# Patient Record
Sex: MALE | Race: WHITE | NOT HISPANIC OR LATINO | Employment: OTHER | URBAN - METROPOLITAN AREA
[De-identification: names, ages, dates, MRNs, and addresses within clinical notes are randomized per-mention and may not be internally consistent; named-entity substitution may affect disease eponyms.]

---

## 2024-08-19 NOTE — PROGRESS NOTES
EF 53%,Mr. Glez is an 84-year-old male with past medical history of Parkinson's and dementia.  He was taken to an outside facility with concerns of altered mental status and confusion.  He was admitted to [facility] where he was found to have bilateral small PE on CTA.  Also found to have an acute DVT.  He has been receiving therapeutic Lovenox.  PE was thought to be induced by receiving estrogen therapy for hypersexuality at his nursing home, this has been discontinued.  CT and MRI brain were negative.  Patient was noted to have some pocketing and coughing with any kind of intake including liquids.  NG tube was placed to give his Parkinson's medications and this did not improve.  Requested transfer to our facility for speech evaluation.  He is currently 100% on room air.

## 2024-08-20 ENCOUNTER — HOSPITAL ENCOUNTER (INPATIENT)
Facility: HOSPITAL | Age: 84
LOS: 1 days | Discharge: NURSING, REHAB OR LONG-TERM CARE | End: 2024-08-21
Attending: STUDENT IN AN ORGANIZED HEALTH CARE EDUCATION/TRAINING PROGRAM | Admitting: FAMILY MEDICINE
Payer: MEDICARE

## 2024-08-20 ENCOUNTER — APPOINTMENT (OUTPATIENT)
Dept: GENERAL RADIOLOGY | Facility: HOSPITAL | Age: 84
End: 2024-08-20
Payer: MEDICARE

## 2024-08-20 DIAGNOSIS — R13.12 OROPHARYNGEAL DYSPHAGIA: Primary | ICD-10-CM

## 2024-08-20 DIAGNOSIS — R26.2 DIFFICULTY WALKING: ICD-10-CM

## 2024-08-20 PROBLEM — F02.80 PARKINSON'S DISEASE DEMENTIA: Status: ACTIVE | Noted: 2024-08-20

## 2024-08-20 PROBLEM — R13.10 DYSPHAGIA: Status: ACTIVE | Noted: 2024-08-20

## 2024-08-20 PROBLEM — I26.99 PULMONARY EMBOLI: Status: ACTIVE | Noted: 2024-08-20

## 2024-08-20 PROBLEM — G20.A1 PARKINSON'S DISEASE DEMENTIA: Status: ACTIVE | Noted: 2024-08-20

## 2024-08-20 LAB
ALBUMIN SERPL-MCNC: 2.8 G/DL (ref 3.5–5.2)
ALBUMIN/GLOB SERPL: 0.9 G/DL
ALP SERPL-CCNC: 46 U/L (ref 39–117)
ALT SERPL W P-5'-P-CCNC: <5 U/L (ref 1–41)
ANION GAP SERPL CALCULATED.3IONS-SCNC: 7.5 MMOL/L (ref 5–15)
AST SERPL-CCNC: 27 U/L (ref 1–40)
BACTERIA UR QL AUTO: ABNORMAL /HPF
BASOPHILS # BLD AUTO: 0.06 10*3/MM3 (ref 0–0.2)
BASOPHILS NFR BLD AUTO: 0.8 % (ref 0–1.5)
BILIRUB SERPL-MCNC: 0.4 MG/DL (ref 0–1.2)
BILIRUB UR QL STRIP: NEGATIVE
BUN SERPL-MCNC: 21 MG/DL (ref 8–23)
BUN/CREAT SERPL: 26.9 (ref 7–25)
CALCIUM SPEC-SCNC: 8.3 MG/DL (ref 8.6–10.5)
CHLORIDE SERPL-SCNC: 105 MMOL/L (ref 98–107)
CLARITY UR: CLEAR
CO2 SERPL-SCNC: 27.5 MMOL/L (ref 22–29)
COLOR UR: YELLOW
CREAT SERPL-MCNC: 0.78 MG/DL (ref 0.76–1.27)
DEPRECATED RDW RBC AUTO: 41.1 FL (ref 37–54)
EGFRCR SERPLBLD CKD-EPI 2021: 89 ML/MIN/1.73
EOSINOPHIL # BLD AUTO: 0.27 10*3/MM3 (ref 0–0.4)
EOSINOPHIL NFR BLD AUTO: 3.6 % (ref 0.3–6.2)
ERYTHROCYTE [DISTWIDTH] IN BLOOD BY AUTOMATED COUNT: 12.5 % (ref 12.3–15.4)
GLOBULIN UR ELPH-MCNC: 3.2 GM/DL
GLUCOSE SERPL-MCNC: 108 MG/DL (ref 65–99)
GLUCOSE UR STRIP-MCNC: NEGATIVE MG/DL
HCT VFR BLD AUTO: 36.4 % (ref 37.5–51)
HGB BLD-MCNC: 12.2 G/DL (ref 13–17.7)
HGB UR QL STRIP.AUTO: ABNORMAL
HOLD SPECIMEN: NORMAL
HOLD SPECIMEN: NORMAL
HYALINE CASTS UR QL AUTO: ABNORMAL /LPF
IMM GRANULOCYTES # BLD AUTO: 0.12 10*3/MM3 (ref 0–0.05)
IMM GRANULOCYTES NFR BLD AUTO: 1.6 % (ref 0–0.5)
KETONES UR QL STRIP: ABNORMAL
LEUKOCYTE ESTERASE UR QL STRIP.AUTO: ABNORMAL
LYMPHOCYTES # BLD AUTO: 1.61 10*3/MM3 (ref 0.7–3.1)
LYMPHOCYTES NFR BLD AUTO: 21.6 % (ref 19.6–45.3)
MCH RBC QN AUTO: 30.6 PG (ref 26.6–33)
MCHC RBC AUTO-ENTMCNC: 33.5 G/DL (ref 31.5–35.7)
MCV RBC AUTO: 91.2 FL (ref 79–97)
MONOCYTES # BLD AUTO: 0.56 10*3/MM3 (ref 0.1–0.9)
MONOCYTES NFR BLD AUTO: 7.5 % (ref 5–12)
NEUTROPHILS NFR BLD AUTO: 4.84 10*3/MM3 (ref 1.7–7)
NEUTROPHILS NFR BLD AUTO: 64.9 % (ref 42.7–76)
NITRITE UR QL STRIP: NEGATIVE
NRBC BLD AUTO-RTO: 0 /100 WBC (ref 0–0.2)
PH UR STRIP.AUTO: 6.5 [PH] (ref 5–8)
PLATELET # BLD AUTO: 213 10*3/MM3 (ref 140–450)
PMV BLD AUTO: 9.9 FL (ref 6–12)
POTASSIUM SERPL-SCNC: 3.6 MMOL/L (ref 3.5–5.2)
PROT SERPL-MCNC: 6 G/DL (ref 6–8.5)
PROT UR QL STRIP: ABNORMAL
RBC # BLD AUTO: 3.99 10*6/MM3 (ref 4.14–5.8)
RBC # UR STRIP: ABNORMAL /HPF
REF LAB TEST METHOD: ABNORMAL
SODIUM SERPL-SCNC: 140 MMOL/L (ref 136–145)
SP GR UR STRIP: 1.02 (ref 1–1.03)
SQUAMOUS #/AREA URNS HPF: ABNORMAL /HPF
UROBILINOGEN UR QL STRIP: ABNORMAL
WBC # UR STRIP: ABNORMAL /HPF
WBC NRBC COR # BLD AUTO: 7.46 10*3/MM3 (ref 3.4–10.8)
WHOLE BLOOD HOLD COAG: NORMAL

## 2024-08-20 PROCEDURE — 71045 X-RAY EXAM CHEST 1 VIEW: CPT

## 2024-08-20 PROCEDURE — 25810000003 LACTATED RINGERS PER 1000 ML: Performed by: FAMILY MEDICINE

## 2024-08-20 PROCEDURE — 74230 X-RAY XM SWLNG FUNCJ C+: CPT

## 2024-08-20 PROCEDURE — 92610 EVALUATE SWALLOWING FUNCTION: CPT

## 2024-08-20 PROCEDURE — 81001 URINALYSIS AUTO W/SCOPE: CPT | Performed by: FAMILY MEDICINE

## 2024-08-20 PROCEDURE — 87086 URINE CULTURE/COLONY COUNT: CPT | Performed by: FAMILY MEDICINE

## 2024-08-20 PROCEDURE — 99223 1ST HOSP IP/OBS HIGH 75: CPT | Performed by: FAMILY MEDICINE

## 2024-08-20 PROCEDURE — 25010000002 ENOXAPARIN PER 10 MG: Performed by: FAMILY MEDICINE

## 2024-08-20 PROCEDURE — 85025 COMPLETE CBC W/AUTO DIFF WBC: CPT | Performed by: FAMILY MEDICINE

## 2024-08-20 PROCEDURE — 92611 MOTION FLUOROSCOPY/SWALLOW: CPT

## 2024-08-20 PROCEDURE — 97161 PT EVAL LOW COMPLEX 20 MIN: CPT

## 2024-08-20 PROCEDURE — 80053 COMPREHEN METABOLIC PANEL: CPT | Performed by: FAMILY MEDICINE

## 2024-08-20 RX ORDER — LISINOPRIL 5 MG/1
5 TABLET ORAL DAILY
COMMUNITY
Start: 2024-08-20 | End: 2025-08-21

## 2024-08-20 RX ORDER — ONDANSETRON 2 MG/ML
4 INJECTION INTRAMUSCULAR; INTRAVENOUS EVERY 6 HOURS PRN
Status: DISCONTINUED | OUTPATIENT
Start: 2024-08-20 | End: 2024-08-21 | Stop reason: HOSPADM

## 2024-08-20 RX ORDER — SODIUM CHLORIDE 0.9 % (FLUSH) 0.9 %
10 SYRINGE (ML) INJECTION EVERY 12 HOURS SCHEDULED
Status: DISCONTINUED | OUTPATIENT
Start: 2024-08-20 | End: 2024-08-21 | Stop reason: HOSPADM

## 2024-08-20 RX ORDER — CARBIDOPA/LEVODOPA 25MG-250MG
1 TABLET ORAL 4 TIMES DAILY
COMMUNITY

## 2024-08-20 RX ORDER — AMOXICILLIN 250 MG
2 CAPSULE ORAL 2 TIMES DAILY PRN
Status: DISCONTINUED | OUTPATIENT
Start: 2024-08-20 | End: 2024-08-21 | Stop reason: HOSPADM

## 2024-08-20 RX ORDER — POLYETHYLENE GLYCOL 3350 17 G/17G
17 POWDER, FOR SOLUTION ORAL DAILY PRN
Status: DISCONTINUED | OUTPATIENT
Start: 2024-08-20 | End: 2024-08-21 | Stop reason: HOSPADM

## 2024-08-20 RX ORDER — ACETAMINOPHEN 325 MG/1
650 TABLET ORAL EVERY 4 HOURS PRN
COMMUNITY

## 2024-08-20 RX ORDER — SODIUM CHLORIDE 9 MG/ML
40 INJECTION, SOLUTION INTRAVENOUS AS NEEDED
Status: DISCONTINUED | OUTPATIENT
Start: 2024-08-20 | End: 2024-08-21 | Stop reason: HOSPADM

## 2024-08-20 RX ORDER — FOLIC ACID 1 MG/1
1 TABLET ORAL DAILY
COMMUNITY
Start: 2024-07-25

## 2024-08-20 RX ORDER — DIVALPROEX SODIUM 125 MG/1
250 CAPSULE, COATED PELLETS ORAL 2 TIMES DAILY
COMMUNITY

## 2024-08-20 RX ORDER — SODIUM CHLORIDE 0.9 % (FLUSH) 0.9 %
10 SYRINGE (ML) INJECTION AS NEEDED
Status: DISCONTINUED | OUTPATIENT
Start: 2024-08-20 | End: 2024-08-21 | Stop reason: HOSPADM

## 2024-08-20 RX ORDER — LEVOTHYROXINE SODIUM 25 UG/1
25 TABLET ORAL DAILY
COMMUNITY

## 2024-08-20 RX ORDER — LISINOPRIL 10 MG/1
5 TABLET ORAL
Status: DISCONTINUED | OUTPATIENT
Start: 2024-08-20 | End: 2024-08-21 | Stop reason: HOSPADM

## 2024-08-20 RX ORDER — FOLIC ACID 1 MG/1
1 TABLET ORAL DAILY
Status: DISCONTINUED | OUTPATIENT
Start: 2024-08-20 | End: 2024-08-21 | Stop reason: HOSPADM

## 2024-08-20 RX ORDER — SODIUM CHLORIDE, SODIUM LACTATE, POTASSIUM CHLORIDE, CALCIUM CHLORIDE 600; 310; 30; 20 MG/100ML; MG/100ML; MG/100ML; MG/100ML
75 INJECTION, SOLUTION INTRAVENOUS CONTINUOUS
Status: ACTIVE | OUTPATIENT
Start: 2024-08-20 | End: 2024-08-21

## 2024-08-20 RX ORDER — ATORVASTATIN CALCIUM 20 MG/1
20 TABLET, FILM COATED ORAL NIGHTLY
Status: DISCONTINUED | OUTPATIENT
Start: 2024-08-20 | End: 2024-08-21 | Stop reason: HOSPADM

## 2024-08-20 RX ORDER — ATORVASTATIN CALCIUM 20 MG/1
20 TABLET, FILM COATED ORAL DAILY
COMMUNITY

## 2024-08-20 RX ORDER — ENOXAPARIN SODIUM 100 MG/ML
1 INJECTION SUBCUTANEOUS EVERY 12 HOURS
Status: DISCONTINUED | OUTPATIENT
Start: 2024-08-20 | End: 2024-08-21 | Stop reason: HOSPADM

## 2024-08-20 RX ORDER — LEVOTHYROXINE SODIUM 25 UG/1
25 TABLET ORAL
Status: DISCONTINUED | OUTPATIENT
Start: 2024-08-20 | End: 2024-08-21 | Stop reason: HOSPADM

## 2024-08-20 RX ORDER — ENOXAPARIN SODIUM 100 MG/ML
1 INJECTION SUBCUTANEOUS EVERY 12 HOURS
Status: DISCONTINUED | OUTPATIENT
Start: 2024-08-20 | End: 2024-08-20

## 2024-08-20 RX ORDER — DIVALPROEX SODIUM 125 MG/1
250 CAPSULE, COATED PELLETS ORAL 2 TIMES DAILY
Status: DISCONTINUED | OUTPATIENT
Start: 2024-08-20 | End: 2024-08-21 | Stop reason: HOSPADM

## 2024-08-20 RX ORDER — BISACODYL 5 MG/1
5 TABLET, DELAYED RELEASE ORAL DAILY PRN
Status: DISCONTINUED | OUTPATIENT
Start: 2024-08-20 | End: 2024-08-21 | Stop reason: HOSPADM

## 2024-08-20 RX ORDER — PROPRANOLOL HYDROCHLORIDE 120 MG/1
120 CAPSULE, EXTENDED RELEASE ORAL DAILY
COMMUNITY
Start: 2024-07-10

## 2024-08-20 RX ORDER — CARBIDOPA/LEVODOPA 25MG-250MG
1 TABLET ORAL 4 TIMES DAILY
Status: DISCONTINUED | OUTPATIENT
Start: 2024-08-20 | End: 2024-08-21 | Stop reason: HOSPADM

## 2024-08-20 RX ORDER — BISACODYL 10 MG
10 SUPPOSITORY, RECTAL RECTAL DAILY PRN
Status: DISCONTINUED | OUTPATIENT
Start: 2024-08-20 | End: 2024-08-21 | Stop reason: HOSPADM

## 2024-08-20 RX ORDER — LANOLIN ALCOHOL/MO/W.PET/CERES
1000 CREAM (GRAM) TOPICAL DAILY
COMMUNITY
Start: 2024-07-25

## 2024-08-20 RX ORDER — PROPRANOLOL HCL 60 MG
120 CAPSULE, EXTENDED RELEASE 24HR ORAL DAILY
Status: DISCONTINUED | OUTPATIENT
Start: 2024-08-20 | End: 2024-08-21 | Stop reason: HOSPADM

## 2024-08-20 RX ORDER — ESTRADIOL 2 MG/1
2 TABLET ORAL DAILY
COMMUNITY
Start: 2024-07-10 | End: 2024-08-21 | Stop reason: HOSPADM

## 2024-08-20 RX ADMIN — BARIUM SULFATE 20 ML: 400 PASTE ORAL at 12:27

## 2024-08-20 RX ADMIN — BARIUM SULFATE 50 ML: 400 SUSPENSION ORAL at 12:27

## 2024-08-20 RX ADMIN — ATORVASTATIN CALCIUM 20 MG: 20 TABLET, FILM COATED ORAL at 20:19

## 2024-08-20 RX ADMIN — Medication 10 ML: at 09:41

## 2024-08-20 RX ADMIN — DIVALPROEX SODIUM 250 MG: 125 CAPSULE, COATED PELLETS ORAL at 20:19

## 2024-08-20 RX ADMIN — ENOXAPARIN SODIUM 80 MG: 100 INJECTION SUBCUTANEOUS at 20:19

## 2024-08-20 RX ADMIN — LISINOPRIL 5 MG: 10 TABLET ORAL at 14:50

## 2024-08-20 RX ADMIN — CARBIDOPA AND LEVODOPA 1 TABLET: 25; 250 TABLET ORAL at 18:14

## 2024-08-20 RX ADMIN — CARBIDOPA AND LEVODOPA 1 TABLET: 25; 250 TABLET ORAL at 14:52

## 2024-08-20 RX ADMIN — Medication 10 ML: at 20:23

## 2024-08-20 RX ADMIN — LEVOTHYROXINE SODIUM 25 MCG: 0.03 TABLET ORAL at 14:53

## 2024-08-20 RX ADMIN — SODIUM CHLORIDE, POTASSIUM CHLORIDE, SODIUM LACTATE AND CALCIUM CHLORIDE 75 ML/HR: 600; 310; 30; 20 INJECTION, SOLUTION INTRAVENOUS at 12:58

## 2024-08-20 RX ADMIN — SODIUM CHLORIDE, POTASSIUM CHLORIDE, SODIUM LACTATE AND CALCIUM CHLORIDE 75 ML/HR: 600; 310; 30; 20 INJECTION, SOLUTION INTRAVENOUS at 01:00

## 2024-08-20 RX ADMIN — BARIUM SULFATE 55 ML: 0.81 POWDER, FOR SUSPENSION ORAL at 12:27

## 2024-08-20 RX ADMIN — FOLIC ACID 1 MG: 1 TABLET ORAL at 14:50

## 2024-08-20 RX ADMIN — PROPRANOLOL HYDROCHLORIDE 120 MG: 60 CAPSULE, EXTENDED RELEASE ORAL at 14:50

## 2024-08-20 RX ADMIN — ENOXAPARIN SODIUM 80 MG: 100 INJECTION SUBCUTANEOUS at 06:23

## 2024-08-20 RX ADMIN — CARBIDOPA AND LEVODOPA 1 TABLET: 25; 250 TABLET ORAL at 20:19

## 2024-08-20 NOTE — PLAN OF CARE
Goal Outcome Evaluation:         ASSESSMENT/ PLAN OF CARE:  Pt presents with limitations, noted below, that impede patient's ability to tolerate least restrictive diet safely and independently. The skills of a therapist will be required to safely and effectively implement the following treatment plan to restore maximal level of function.    PROBLEMS:  1.  Risk of aspiration, swallow delay     TREATMENT: Speech therapy for dysphagia, education of strategies and tolerance of least restrictive diet.    FREQUENCY/DURATION: Daily, 5 days a week    REHAB POTENTIAL:  Pt has good/fair rehab potential.  The following limitations may influence improvement/ length of tx medical status.    RECOMMENDATIONS:   1.   DIET: Mechanical soft solids, thin liquids    2.  POSITION: Fully upright for all p.o. intake, 30 minutes following    3.  COMPENSATORY STRATEGIES: Assist for feeding, small bites and sips, meds in applesauce              Anticipated Discharge Disposition (SLP): anticipate therapy at next level of care

## 2024-08-20 NOTE — PLAN OF CARE
Goal Outcome Evaluation:  Plan of Care Reviewed With: patient        Progress: no change  Outcome Evaluation: Patient presents with deficits in balance, endurance, transfers, and ambulation. Patient will benefit from skilled PT services to address these mobility deficits and decrease risk of falls.      Anticipated Discharge Disposition (PT): sub acute care setting

## 2024-08-20 NOTE — CONSULTS
"Nutrition Services    Patient Name: Vic Glez  YOB: 1940  MRN: [id number]  Admission date: 8/20/2024      CLINICAL NUTRITION ASSESSMENT      Reason for Assessment  MST Score 2+ and Pressure Injury     H&P:  Past Medical History:   Diagnosis Date    CKD (chronic kidney disease)     Dementia     DVT (deep vein thrombosis) in pregnancy     Hypertension     Parkinson's disease         Current Problems:   Active Hospital Problems    Diagnosis     **Dysphagia     Pulmonary emboli     Parkinson's disease dementia         Nutrition/Diet History         Narrative   New admit from a SNF for Parkinson's-related dysphagia. Noted to have pocketing as well as coughing with liquid PO intake. Brought here for dysphagia evaluation. Screened at risk d/t being unsure of wt loss. No wt history available in EMR. Current wt within healthy range for age. Per chart, pt had an NG placed for provision of meds which was unsuccessful, not noted to have NG currently. SLP has placed order for mechanical ground texture diet.     Anthropometrics        Current Height, Weight Height: 180.3 cm (71\")  Weight: 78.9 kg (173 lb 15.1 oz)   Current BMI Body mass index is 24.26 kg/m².   BMI Classification Normal range - Healthy BMI for age 23-30   % %   Adjusted Body Weight (ABW)    Weight Hx  Wt Readings from Last 30 Encounters:   08/20/24 0300 78.9 kg (173 lb 15.1 oz)          Wt Change Observation Unable to trend d/t lack of history     Estimated/Assessed Needs  Estimated Needs based on: Current Body Weight       Energy Requirements 25-30 kcal/kg   EST Needs (kcal/day) 1975-2370       Protein Requirements 1.0-1.2 g/kg   EST Daily Needs (g/day) 79-95       Fluid Requirements 1 ml/kcal    Estimated Needs (mL/day) 1975-2370     Labs/Medications         Pertinent Labs Reviewed.   Results from last 7 days   Lab Units 08/20/24  0055   SODIUM mmol/L 140   POTASSIUM mmol/L 3.6   CHLORIDE mmol/L 105   CO2 mmol/L 27.5   BUN mg/dL 21 " "  CREATININE mg/dL 0.78   CALCIUM mg/dL 8.3*   BILIRUBIN mg/dL 0.4   ALK PHOS U/L 46   ALT (SGPT) U/L <5   AST (SGOT) U/L 27   GLUCOSE mg/dL 108*     Results from last 7 days   Lab Units 08/20/24  0055   HEMOGLOBIN g/dL 12.2*   HEMATOCRIT % 36.4*     No results found for: \"COVID19\"  No results found for: \"HGBA1C\"      Pertinent Medications Reviewed.     Malnutrition Severity Assessment              Nutrition Diagnosis         Nutrition Dx Problem 1 Inadequate oral Intake related to  swallowing difficulty  as evidenced by  pocketing, coughing, SLP order for mechanical ground texture.     Nutrition Intervention           Current Nutrition Orders & Evaluation of Intake       Current PO Diet No diet orders on file   Supplement No active supplement orders           Nutrition Intervention/Prescription        Continue least restrictive diet texture as recommended by SLP  Dietary restrictions not appropriate d/t advanced age and condition  Add ONS if intake is not meeting needs on new texture        Medical Nutrition Therapy/Nutrition Education          Learner     Readiness N/A  N/A     Method     Response N/A  N/A     Monitor/Evaluation        Monitor PO intake, Weight, Swallow function, Diet advancement     Nutrition Discharge Plan         To be determined     Electronically signed by:  Alexia Talavera RD  08/20/24 12:15 EDT    "

## 2024-08-20 NOTE — THERAPY EVALUATION
Acute Care - Physical Therapy Initial Evaluation   [facility]     Patient Name: Vic Glez  : 1940  MRN: [id number]  Today's Date: 2024      Visit Dx:     ICD-10-CM ICD-9-CM   1. Oropharyngeal dysphagia  R13.12 787.22   2. Difficulty walking  R26.2 719.7     Patient Active Problem List   Diagnosis    Dysphagia    Pulmonary emboli    Parkinson's disease dementia     Past Medical History:   Diagnosis Date    CKD (chronic kidney disease)     Dementia     DVT (deep vein thrombosis) in pregnancy     Hypertension     Parkinson's disease      History reviewed. No pertinent surgical history.  PT Assessment (Last 12 Hours)       PT Evaluation and Treatment       Row Name 24 1317          Physical Therapy Time and Intention    Document Type evaluation  -AV     Mode of Treatment individual therapy;physical therapy  -AV       Row Name 24 1317          General Information    Patient Profile Reviewed yes  -AV     Patient Observations alert;cooperative  -AV     Prior Level of Function --  Per EMR, patient is from [facility]. Required assist for ADLs. Has [facility] for mobility. No family at bedside to verify prior level.  -AV     Existing Precautions/Restrictions fall  -AV       Row Name 24 1317          Living Environment    People in Home facility resident  [facility]  -AV       Row Name 24 1317          Cognition    Orientation Status (Cognition) oriented to;person;place;verbal cues/prompts needed for orientation  Primarily nods head yes/no. Follows all commands  -AV       Row Name 24 1317          Range of Motion (ROM)    Range of Motion bilateral lower extremities;ROM is WFL  -AV       Row Name 24 1317          Bed Mobility    Bed Mobility supine-sit;sit-supine  -AV     Supine-Sit [place] (Bed Mobility) moderate assist (50% patient effort)  -AV     Sit-Supine [place] (Bed Mobility) minimum assist (75% patient effort)  -AV       Row Name  08/20/24 1317          Transfers    Transfers sit-stand transfer;stand-sit transfer  -AV     Comment, (Transfers) Tactile cues to facilitate anterior trunk lean to facilitate stand  -AV       Row Name 08/20/24 1317          Sit-Stand Transfer    Sit-Stand [place] (Transfers) moderate assist (50% patient effort)  -AV     Assistive Device (Sit-Stand Transfers) walker, front-wheeled  -AV       Row Name 08/20/24 1317          Stand-Sit Transfer    Stand-Sit [place] (Transfers) moderate assist (50% patient effort)  -AV     Assistive Device (Stand-Sit Transfers) walker, front-wheeled  -AV       Row Name 08/20/24 1317          Gait/Stairs (Locomotion)    Gait/Stairs Locomotion gait/ambulation independence;gait/ambulation assistive device;distance ambulated  -AV     [place] Level (Gait) moderate assist (50% patient effort)  -AV     Assistive Device (Gait) walker, front-wheeled  -AV     Distance in Feet (Gait) 6  sidestepping right to move towards head of bed prior to return to supine  -AV       Row Name 08/20/24 1317          Safety Issues, Functional Mobility    Impairments Affecting Function (Mobility) balance;cognition;endurance/activity tolerance;strength;postural/trunk control  -AV       Row Name 08/20/24 1317          Balance    Balance Assessment standing dynamic balance  -AV     Dynamic Standing Balance moderate assist  -AV     Position/Device Used, Standing Balance supported;walker, front-wheeled  -AV       Row Name             Wound sacral spine Pressure Injury    Wound - Properties Group Present on Original Admission: Y  -NB Location: sacral spine  -NB Primary Wound Type: Pressure inj  -NB    Retired Wound - Properties Group Present on Original Admission: Y  -NB Location: sacral spine  -NB Primary Wound Type: Pressure inj  -NB    Retired Wound - Properties Group Present on Original Admission: Y  -NB Location: sacral spine  -NB Primary Wound Type: Pressure inj  -NB      Row Name 08/20/24 1317           Plan of Care Review    Plan of Care Reviewed With patient  -AV     Progress no change  -AV     Outcome Evaluation Patient presents with deficits in balance, endurance, transfers, and ambulation. Patient will benefit from skilled PT services to address these mobility deficits and decrease risk of falls.  -AV       Row Name 08/20/24 1317          Therapy Assessment/Plan (PT)    Rehab Potential (PT) good, to achieve stated therapy goals  -AV     Criteria for Skilled Interventions Met (PT) yes;meets criteria  -AV     Therapy Frequency (PT) daily  -AV     Predicted Duration of Therapy Intervention (PT) 10 days  -AV     Problem List (PT) problems related to;balance;cognition;mobility;postural control  -AV     Activity Limitations Related to Problem List (PT) unable to transfer safely;unable to ambulate safely  -AV       Row Name 08/20/24 1317          PT Evaluation Complexity    History, PT Evaluation Complexity 1-2 personal factors and/or comorbidities  -AV     Examination of Body Systems (PT Eval Complexity) total of 4 or more elements  -AV     Clinical Presentation (PT Evaluation Complexity) stable  -AV     Clinical Decision Making (PT Evaluation Complexity) low complexity  -AV     Overall Complexity (PT Evaluation Complexity) low complexity  -AV       Row Name 08/20/24 1317          Therapy Plan Review/Discharge Plan (PT)    Therapy Plan Review (PT) evaluation/treatment results reviewed;patient  -AV       Row Name 08/20/24 1317          Physical Therapy Goals    Bed Mobility Goal Selection (PT) bed mobility, PT goal 1  -AV     Transfer Goal Selection (PT) transfer, PT goal 1  -AV     Gait Training Goal Selection (PT) gait training, PT goal 1  -AV       Row Name 08/20/24 1317          Bed Mobility Goal 1 (PT)    Activity/Assistive Device (Bed Mobility Goal 1, PT) sit to supine/supine to sit  -AV     [place] Level/Cues Needed (Bed Mobility Goal 1, PT) contact guard required  -AV     Time Frame (Bed Mobility Goal  1, PT) 10 days  -AV       Row Name 08/20/24 1317          Transfer Goal 1 (PT)    Activity/Assistive Device (Transfer Goal 1, PT) sit-to-stand/stand-to-sit;bed-to-chair/chair-to-bed;walker, rolling  -AV     [place] Level/Cues Needed (Transfer Goal 1, PT) contact guard required  -AV     Time Frame (Transfer Goal 1, PT) 10 days  -AV       Row Name 08/20/24 1317          Gait Training Goal 1 (PT)    Activity/Assistive Device (Gait Training Goal 1, PT) gait (walking locomotion);assistive device use;walker, rolling  -AV     [place] Level (Gait Training Goal 1, PT) contact guard required  -AV     Distance (Gait Training Goal 1, PT) 50  -AV     Time Frame (Gait Training Goal 1, PT) 10 days  -AV               User Key  (r) = Recorded By, (t) = Taken By, (c) = Cosigned By      Initials Name Provider Type    AV Jonathan Sanz, PORSHA Physical Therapist    Yuni Turcios RN Registered Nurse                    Physical Therapy Education       Title: PT OT SLP Therapies (In Progress)       Topic: Physical Therapy (In Progress)       Point: Mobility training (Done)       Learning Progress Summary             Patient Acceptance, E,TB, VU by AV at 8/20/2024 1347                         Point: Home exercise program (Not Started)       Learner Progress:  Not documented in this visit.              Point: Body mechanics (Done)       Learning Progress Summary             Patient Acceptance, E,TB, VU by AV at 8/20/2024 1347                         Point: Precautions (Done)       Learning Progress Summary             Patient Acceptance, E,TB, VU by AV at 8/20/2024 1347                                         User Key       Initials Effective Dates Name Provider Type Discipline     06/11/21 -  Jonathan Sanz, PT Physical Therapist PT                  PT Recommendation and Plan  Anticipated Discharge Disposition (PT): sub acute care setting  Planned Therapy Interventions (PT): balance training, bed mobility training,  gait training, home exercise program, neuromuscular re-education, strengthening, transfer training  Therapy Frequency (PT): daily  Plan of Care Reviewed With: patient  Progress: no change  Outcome Evaluation: Patient presents with deficits in balance, endurance, transfers, and ambulation. Patient will benefit from skilled PT services to address these mobility deficits and decrease risk of falls.   Outcome Measures       Row Name 08/20/24 1300             How much help from another person do you currently need...    Turning from your back to your side while in flat bed without using bedrails? 2  -AV      Moving from lying on back to sitting on the side of a flat bed without bedrails? 2  -AV      Moving to and from a bed to a chair (including a wheelchair)? 2  -AV      Standing up from a chair using your arms (e.g., wheelchair, bedside chair)? 2  -AV      Climbing 3-5 steps with a railing? 1  -AV      To walk in hospital room? 2  -AV      AM-PAC 6 Clicks Score (PT) 11  -AV      Highest Level of Mobility Goal 4 --> Transfer to chair/commode  -AV         Functional Assessment    Outcome Measure Options AM-PAC 6 Clicks Basic Mobility (PT)  -AV                User Key  (r) = Recorded By, (t) = Taken By, (c) = Cosigned By      Initials Name Provider Type    AV Jonathan Sanz, PT Physical Therapist                     Time Calculation:    PT Charges       Row Name 08/20/24 1346             Time Calculation    PT Received On 08/20/24  -AV      PT Goal Re-Cert Due Date 08/29/24  -AV         Untimed Charges    PT Eval/Re-eval Minutes 40  -AV         Total Minutes    Untimed Charges Total Minutes 40  -AV       Total Minutes 40  -AV                User Key  (r) = Recorded By, (t) = Taken By, (c) = Cosigned By      Initials Name Provider Type    AV Jonathan Sanz, PT Physical Therapist                  Therapy Charges for Today       Code Description Service Date Service Provider Modifiers Qty    19644799705 HC PT EVAL LOW  COMPLEXITY 3 8/20/2024 Jonathan Sanz, PT GP 1            PT G-Codes  Outcome Measure Options: AM-PAC 6 Clicks Basic Mobility (PT)  AM-PAC 6 Clicks Score (PT): 11    Jonathan Sanz, PT  8/20/2024

## 2024-08-20 NOTE — PLAN OF CARE
Goal Outcome Evaluation:  Plan of Care Reviewed With: patient      Pt has been restless at times since admittance, but verbalizing no pain or discomfort. Very slow to speak after asking him a question. Alert and oriented x3, did not know time of day though. Unable to fully assess mobility but able to turn on his side independently, needs reminders/help to do so every 2 hours. Has a small open area on his coccyx, wound care consult ordered. Urine obtained for UA, sent to lab. CXR completed at bedside.

## 2024-08-20 NOTE — THERAPY EVALUATION
Acute Care - Speech Language Pathology   Swallow Initial Evaluation [facility]     Patient Name: Vic Glez  : 1940  MRN: [id number]  Today's Date: 2024               Admit Date: 2024    Visit Dx:     ICD-10-CM ICD-9-CM   1. Oropharyngeal dysphagia  R13.12 787.22     Patient Active Problem List   Diagnosis    Dysphagia    Pulmonary emboli    Parkinson's disease dementia     Past Medical History:   Diagnosis Date    CKD (chronic kidney disease)     Dementia     DVT (deep vein thrombosis) in pregnancy     Hypertension     Parkinson's disease      History reviewed. No pertinent surgical history.    SLP Recommendation and Plan             Inpatient Speech Pathology Dysphagia Evaluation        PAIN SCALE: Indicated    PRECAUTIONS/CONTRAINDICATIONS: Standard, fall    SUSPECTED ABUSE/NEGLECT/EXPLOITATION: None identified    SOCIAL/PSYCHOLOGICAL NEEDS/BARRIERS: History of dementia    PAST SOCIAL HISTORY: 84-year-old male, nursing home resident    PRIOR FUNCTION: On a p.o. diet at the nursing home however consistency not known.  He was reportedly n.p.o. with NG tube at previous hospital ([facility])    PATIENT GOALS/EXPECTATIONS: Did not state or indicate goals or expectations    HISTORY: Patient is 84-year-old male who was admitted to [facility] on 2024 secondary to altered mental status and confusion.  He was initially admitted to [facility] where he was found to have a bilateral small PE on CTA.  CT and MRI were negative.  Patient reportedly was noted to have some pocketing and coughing with any kind of intake including liquids.  Outside hospital placed NG tube to give his Parkinson's medications however this did not improve.  Transfer was requested to [facility] facility for speech evaluation as outside hospital reportedly did not have speech therapy services available.  He is currently 100% on room air.    CURRENT DIET LEVEL: N.p.o.    OBJECTIVE:    TEST  ADMINISTERED: Clinical dysphagia evaluation    COGNITION/SAFETY AWARENESS: Not thoroughly evaluated    BEHAVIORAL OBSERVATIONS: Awake, cooperative    ORAL MOTOR EXAM: Natural dentition however several missing, dry oral mucosa    VOICE QUALITY: Weak however clear    REFLEX EXAM: Deferred    POSTURE: Sitting fully upright    FEEDING/SWALLOWING FUNCTION: Assessed with thin liquids, nectar thick liquids, purée solids, soft solids    CLINICAL OBSERVATIONS: Nectar thick liquids by spoon and controlled cup drink.  Swallows completed with mild delay.  Laryngeal sounds clear cervical auscultation.  Thin liquids by spoon and controlled cup drink.  Swallows completed with mild delay.  Laryngeal sounds clear to cervical auscultation.  Purée solids with lingual pumping with swallow completed.  Soft solids cut small.  Extended chewing with lingual pumping.  Delayed swallow completed.  Double swallow observed.  Nectar thick liquid by controlled straw drink.  Swallow completed with mild delay.  Thin liquid by controlled straw drink with swallow completed with mild delay.  Laryngeal elevation noted palpation.  Patient exhibiting mild swallow delay with consistencies at bedside.  No overt clinical signs or symptoms of aspiration were observed at bedside however cannot rule out silent aspiration.    DYSPHAGIA CRITERIA: Risk of aspiration    FUNCTIONAL ASSESSMENT INSTRUMENT: Patient currently scored a level 5 of 7 on Functional Communication Measures for swallowing indicating a 20-39% limitation in function.    ASSESSMENT/ PLAN OF CARE:  Pt presents with limitations, noted below, that impede patient's ability to tolerate least restrictive diet safely and independently. The skills of a therapist will be required to safely and effectively implement the following treatment plan to restore maximal level of function.    PROBLEMS:  1.  Risk of aspiration, swallow delay   LTG 1: 30 days.  Patient will increase functional communication  measures for swallowing to level 6 of 7, indicating 1-19% limitation in function.   STG 1a: 14 days.  Patient will tolerate trials of mechanical soft solids with minimal to no signs or symptoms of aspiration.   STG 1b: 14 days.  Patient will tolerate trials of thin liquids with minimal to no signs or symptoms of aspiration.   TREATMENT: Speech therapy for dysphagia, education of strategies and tolerance of least restrictive diet.    FREQUENCY/DURATION: Daily, 5 days a week    REHAB POTENTIAL:  Pt has good/fair rehab potential.  The following limitations may influence improvement/ length of tx medical status.    RECOMMENDATIONS:   1.   DIET: Mechanical soft solids, thin liquids    2.  POSITION: Fully upright for all p.o. intake, 30 minutes following    3.  COMPENSATORY STRATEGIES: Assist for feeding, small bites and sips, meds in applesauce    Pt/responsible party agrees with plan of care and has been informed of all alternatives, risks and benefits.                 Anticipated Discharge Disposition (SLP): anticipate therapy at next level of care (08/20/24 1218)                                                               EDUCATION  The patient has been educated in the following areas:   Dysphagia (Swallowing Impairment).                Time Calculation:    Time Calculation- SLP       Row Name 08/20/24 1218             Time Calculation- SLP    SLP Stop Time 1215  -SN      SLP Received On 08/20/24  -SN         Untimed Charges    54172-TL Eval Oral Pharyng Swallow Minutes 60  -SN      27234-MH Motion Fluoro Eval Swallow Minutes 90  -SN         Total Minutes    Untimed Charges Total Minutes 150  -SN       Total Minutes 150  -SN                User Key  (r) = Recorded By, (t) = Taken By, (c) = Cosigned By      Initials Name Provider Type    Jolanta Barber MS-CCC/SLP, CNT Speech and Language Pathologist                    Therapy Charges for Today       Code Description Service Date Service Provider Modifiers Qty     04979020248 HC ST MOTION FLUORO EVAL SWALLOW 6 8/20/2024 Jolanta Corey MS-CCC/SLP, CNT GN 1    81299596245 HC ST EVAL ORAL PHARYNG SWALLOW 4 8/20/2024 Jolanta Corey, MS-CCC/SLP, CNT GN 1                 KAE Garcia/SLP, CNT  8/20/2024

## 2024-08-20 NOTE — H&P
[facility]   HISTORY AND PHYSICAL    Patient Name: Vic Glez  : 1940  MRN: [id number]  Primary Care Physician:  Jennifer Grayson MD  Date of admission: 2024    Subjective   Subjective     Chief Complaint: Dysphagia    HPI:    Vic Glez is a 84 y.o. male with past medical history of Parkinson's, dementia, DVT, GERD was transferred to this facility from [place] for swallow evaluation due to dysphagia.  Patient initially presented outside facility from nursing home due to altered mental status, weakness and confusion and was found to have a UTI.  Patient was then found to have bilateral PEs and acute lower extremity DVT and thus started on therapeutic Lovenox.  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  Patient was found to have coughing and shortness of breath with any kind of intake including fluids.  Due to lack of speech therapy patient was transferred here for further management.  Patient was stable and saturating 100% prior to transfer.  At our facility labs were relatively unremarkable.  When seen patient was resting comfortably although with a significant resting tremor bilateral upper extremity.  Patient cannot give history due to his dementia.  He did have a persistent cough.        Review of Systems  Patient confused, with dementia    Personal History     Past Medical History:   Diagnosis Date    CKD (chronic kidney disease)     Dementia     DVT (deep vein thrombosis) in pregnancy     Hypertension     Parkinson's disease        History reviewed. No pertinent surgical history.    Family History: family history is not on file. Otherwise pertinent FHx was reviewed and not pertinent to current issue.    Social History:  reports that he has never smoked. He has never been exposed to tobacco smoke. He has never used smokeless tobacco. He reports that he does not drink alcohol and does not use drugs.    Home Medications:          Allergies:  No Known Allergies    Objective   Objective     Vitals:      Physical Exam    Constitutional: Difficulty communicating, tremor   Eyes: PERRLA, sclerae anicteric, no conjunctival injection   HENT: NCAT, dry mucous membrane neck: Supple, no thyromegaly, no lymphadenopathy, trachea midline   Respiratory: Bilateral rhonchi, nonlabored breathing   Cardiovascular: RRR, no murmurs, rubs, or gallops, palpable pedal pulses bilaterally   Gastrointestinal: Positive bowel sounds, soft, nontender, nondistended   Musculoskeletal: No bilateral ankle edema, no clubbing or cyanosis to extremities   Psychiatric: Appropriate affect, cooperative   Neurologic: Resting tremor, pupils reactive, lower extremity weakness, speech unclear   Skin: Sacral wounds    Result Review    Result Review:  I have personally reviewed the results from the time of this admission to 8/20/2024 04:12 EDT and agree with these findings:  [x]  Laboratory list / accordion  []  Microbiology  []  Radiology  []  EKG/Telemetry   []  Cardiology/Vascular   []  Pathology  []  Old records  []  Other:  Most notable findings include: No leukocytosis, mild anemia, CTA prior facility with PE      Assessment & Plan   Assessment / Plan     Brief Patient Summary:  Vic Glez is a 84 y.o. male with past medical history of Parkinson's, dementia, DVT, GERD was transferred to this facility from [place] for swallow evaluation due to dysphagia.    Active Hospital Problems:  Active Hospital Problems    Diagnosis     **Dysphagia     Pulmonary emboli     Parkinson's disease dementia      Plan:     Dysphagia  -Admit to medical floor  -Patient with Parkinson's, dementia  -N.p.o.  -IVF  -Swallow eval  -Supportive care    New DVT/PE  -Will resume therapeutic Lovenox  -Patient on room air  -Transition to DOAC/warfarin when warranted  -Supportive care    CKD  Hyperlipidemia    GI ppx  DVT ppx    VTE Prophylaxis:  No VTE prophylaxis order currently exists.        CODE  STATUS:    Level Of Support Discussed With: Patient  Code Status (Patient has no pulse and is not breathing): CPR (Attempt to Resuscitate)  Medical Interventions (Patient has pulse or is breathing): Full Support    Admission Status:  I believe this patient meets observation status.      Electronically signed by Peter Adame MD, 08/20/24, 4:12 AM EDT.

## 2024-08-20 NOTE — PROGRESS NOTES
Reviewed hospitalization plan, all questions answered, hospitalized with dysphagia, difficulty swallowing from a nursing facility.  Speech therapy evaluation, clinical course dictate further management, discussed with nurse at the bedside.    Electronically signed by Pierre Singletary MD, 8/20/2024, 12:46 EDT.    Portions of this documentation were transcribed electronically from a voice recognition software.  I confirm all data accurately represents the service(s) I performed at today's visit.

## 2024-08-20 NOTE — MBS/VFSS/FEES
Acute Care - Speech Language Pathology   Swallow  modified barium swallow study   P[facility]y     Patient Name: Vic Glez  : 1940  MRN: [id number]  Today's Date: 2024               Admit Date: 2024    Visit Dx:     ICD-10-CM ICD-9-CM   1. Oropharyngeal dysphagia  R13.12 787.22     Patient Active Problem List   Diagnosis    Dysphagia    Pulmonary emboli    Parkinson's disease dementia     Past Medical History:   Diagnosis Date    CKD (chronic kidney disease)     Dementia     DVT (deep vein thrombosis) in pregnancy     Hypertension     Parkinson's disease      History reviewed. No pertinent surgical history.    SLP Recommendation and Plan         MODIFIED BARIUM SWALLOW STUDY: SPEECH PATHOLOGY REPORT        DATE OF SERVICE:  24    PERTINENT INFORMATION:  Mr Glez is an 84 year old male with dysphagia.    He was referred for an MBSS by Dr. Singletary to rule out aspiration as well as to determine appropriate treatment plan for this patient.      PROCEDURE:    Mr Glez was alert and cooperative.  The patient was viewed in lateral plane.  The following Ba consistencies were administered:  thin barium, nectar thick barium, pudding thick barium, barium mixed with applesauce, barium mixed with cracker.  The following compensatory swallowing strategies were performed: bolus modification, cyclic ingestion.      RESULTS:    1.  Nectar thick liquid by spoon. Lingual pumping with spillage to vallecula. Swallow completed.  2. Nectar thick liquid by cup. Spill to vallecula with swallow completed.  3. Thin liquid by spoon. Spill to vallecula. Swallow completed.  4. Thin liquid by cup. Lingual pumping with spill to vallecula. Swallow completed.  5. Pureed. Lingual pumping with spill to vallecula. Swallow completed.  6. Pudding. Lingual pumping with spill to vallecula. Swallow completed.   7. Nectar thick by straw. Spill to vallecula. Swallow completed.   8. Thin liquid by straw. Single sip. Spill to  vallecula. Swallow completed.  9. Thin liquid by straw with sequential swallows. Spill to vallecula with flash laryngeal penetration.   10. Solid. Chewing with lingual pumping. Swallow completed with residue in vallecula. Cued for dry swallow which partially clears residue.       IMPRESSIONS:    Mr Glez demonstrated oral pharyngeal dysphagia characterized by swallow delay. Flash laryngeal penetration observed with sequential swallows of thin liquid by straw. No tracheal aspiration observed during this study.       RECOMMENDATIONS:   1.  Diet: mechanical soft solids, thin liquids  2.  Positioning: fully upright for all po, 30 minutes following.   3.  Compensatory strategies: small bites/sips, controlled sips (single) of thin liquids.         Yes, Patient/responsible party agrees with the plan of care and has been informed of all alternatives, risks and benefits.    Thank you for this referral.                 Anticipated Discharge Disposition (SLP): anticipate therapy at next level of care (08/20/24 1218)                                                               EDUCATION  The patient has been educated in the following areas:   Dysphagia (Swallowing Impairment).                Time Calculation:    Time Calculation- SLP       Row Name 08/20/24 1218             Time Calculation- SLP    SLP Stop Time 1215  -SN      SLP Received On 08/20/24  -SN         Untimed Charges    73898-DB Eval Oral Pharyng Swallow Minutes 60  -SN      84828-UD Motion Fluoro Eval Swallow Minutes 90  -SN         Total Minutes    Untimed Charges Total Minutes 150  -SN       Total Minutes 150  -SN                User Key  (r) = Recorded By, (t) = Taken By, (c) = Cosigned By      Initials Name Provider Type    Jolanta Barber MS-CCC/SLP, CNT Speech and Language Pathologist                    Therapy Charges for Today       Code Description Service Date Service Provider Modifiers Qty    18791841988  ST MOTION FLUORO EVAL SWALLOW 6 8/20/2024  Jolanta Corey, MS-CCC/SLP, CNT GN 1    [id number]  ST EVAL ORAL PHARYNG SWALLOW 4 8/20/2024 Jolanta Corey MS-CCC/SLP, CNT GN 1                 KAE Garcia/SLP, RENATE  8/20/2024

## 2024-08-20 NOTE — CASE MANAGEMENT/SOCIAL WORK
Discharge Planning Assessment  KARY Garner     Patient Name: Vic Glez  MRN: [id number]  Today's Date: 8/20/2024    Admit Date: 8/20/2024   Discharge Plan       Row Name 08/20/24 1604       Plan    Plan Confirmed with [facility] that pt does have a bed hold and can return once stable. Pharmacy updated in chart.           BRODY Garcias

## 2024-08-21 VITALS
WEIGHT: 173.94 LBS | BODY MASS INDEX: 24.35 KG/M2 | TEMPERATURE: 97.3 F | RESPIRATION RATE: 22 BRPM | HEART RATE: 66 BPM | HEIGHT: 71 IN | SYSTOLIC BLOOD PRESSURE: 102 MMHG | OXYGEN SATURATION: 96 % | DIASTOLIC BLOOD PRESSURE: 90 MMHG

## 2024-08-21 LAB
ALBUMIN SERPL-MCNC: 2.6 G/DL (ref 3.5–5.2)
ALP SERPL-CCNC: 47 U/L (ref 39–117)
ALT SERPL W P-5'-P-CCNC: 22 U/L (ref 1–41)
ANION GAP SERPL CALCULATED.3IONS-SCNC: 6.3 MMOL/L (ref 5–15)
AST SERPL-CCNC: 33 U/L (ref 1–40)
BACTERIA SPEC AEROBE CULT: NO GROWTH
BASOPHILS # BLD AUTO: 0.06 10*3/MM3 (ref 0–0.2)
BASOPHILS NFR BLD AUTO: 1 % (ref 0–1.5)
BILIRUB CONJ SERPL-MCNC: <0.2 MG/DL (ref 0–0.3)
BILIRUB INDIRECT SERPL-MCNC: ABNORMAL MG/DL
BILIRUB SERPL-MCNC: 0.5 MG/DL (ref 0–1.2)
BUN SERPL-MCNC: 17 MG/DL (ref 8–23)
BUN/CREAT SERPL: 19.8 (ref 7–25)
CALCIUM SPEC-SCNC: 8.5 MG/DL (ref 8.6–10.5)
CHLORIDE SERPL-SCNC: 104 MMOL/L (ref 98–107)
CO2 SERPL-SCNC: 27.7 MMOL/L (ref 22–29)
CREAT SERPL-MCNC: 0.86 MG/DL (ref 0.76–1.27)
DEPRECATED RDW RBC AUTO: 40.4 FL (ref 37–54)
EGFRCR SERPLBLD CKD-EPI 2021: 86.5 ML/MIN/1.73
EOSINOPHIL # BLD AUTO: 0.19 10*3/MM3 (ref 0–0.4)
EOSINOPHIL NFR BLD AUTO: 3 % (ref 0.3–6.2)
ERYTHROCYTE [DISTWIDTH] IN BLOOD BY AUTOMATED COUNT: 12.3 % (ref 12.3–15.4)
GLUCOSE SERPL-MCNC: 96 MG/DL (ref 65–99)
HCT VFR BLD AUTO: 36.6 % (ref 37.5–51)
HGB BLD-MCNC: 12.1 G/DL (ref 13–17.7)
IMM GRANULOCYTES # BLD AUTO: 0.09 10*3/MM3 (ref 0–0.05)
IMM GRANULOCYTES NFR BLD AUTO: 1.4 % (ref 0–0.5)
LYMPHOCYTES # BLD AUTO: 1.52 10*3/MM3 (ref 0.7–3.1)
LYMPHOCYTES NFR BLD AUTO: 24.2 % (ref 19.6–45.3)
MAGNESIUM SERPL-MCNC: 1.9 MG/DL (ref 1.6–2.4)
MCH RBC QN AUTO: 30.1 PG (ref 26.6–33)
MCHC RBC AUTO-ENTMCNC: 33.1 G/DL (ref 31.5–35.7)
MCV RBC AUTO: 91 FL (ref 79–97)
MONOCYTES # BLD AUTO: 0.5 10*3/MM3 (ref 0.1–0.9)
MONOCYTES NFR BLD AUTO: 7.9 % (ref 5–12)
NEUTROPHILS NFR BLD AUTO: 3.93 10*3/MM3 (ref 1.7–7)
NEUTROPHILS NFR BLD AUTO: 62.5 % (ref 42.7–76)
NRBC BLD AUTO-RTO: 0 /100 WBC (ref 0–0.2)
PHOSPHATE SERPL-MCNC: 2.6 MG/DL (ref 2.5–4.5)
PLATELET # BLD AUTO: 234 10*3/MM3 (ref 140–450)
PMV BLD AUTO: 10.3 FL (ref 6–12)
POTASSIUM SERPL-SCNC: 3.8 MMOL/L (ref 3.5–5.2)
PROT SERPL-MCNC: 5.6 G/DL (ref 6–8.5)
RBC # BLD AUTO: 4.02 10*6/MM3 (ref 4.14–5.8)
SODIUM SERPL-SCNC: 138 MMOL/L (ref 136–145)
WBC NRBC COR # BLD AUTO: 6.29 10*3/MM3 (ref 3.4–10.8)

## 2024-08-21 PROCEDURE — 83735 ASSAY OF MAGNESIUM: CPT | Performed by: FAMILY MEDICINE

## 2024-08-21 PROCEDURE — 92526 ORAL FUNCTION THERAPY: CPT

## 2024-08-21 PROCEDURE — 99239 HOSP IP/OBS DSCHRG MGMT >30: CPT | Performed by: INTERNAL MEDICINE

## 2024-08-21 PROCEDURE — 85025 COMPLETE CBC W/AUTO DIFF WBC: CPT | Performed by: FAMILY MEDICINE

## 2024-08-21 PROCEDURE — 80048 BASIC METABOLIC PNL TOTAL CA: CPT | Performed by: FAMILY MEDICINE

## 2024-08-21 PROCEDURE — 84100 ASSAY OF PHOSPHORUS: CPT | Performed by: FAMILY MEDICINE

## 2024-08-21 PROCEDURE — 80076 HEPATIC FUNCTION PANEL: CPT | Performed by: FAMILY MEDICINE

## 2024-08-21 PROCEDURE — 25010000002 ENOXAPARIN PER 10 MG: Performed by: FAMILY MEDICINE

## 2024-08-21 RX ORDER — ENOXAPARIN SODIUM 100 MG/ML
1 INJECTION SUBCUTANEOUS EVERY 12 HOURS
Start: 2024-08-21

## 2024-08-21 RX ADMIN — ENOXAPARIN SODIUM 80 MG: 100 INJECTION SUBCUTANEOUS at 08:06

## 2024-08-21 RX ADMIN — FOLIC ACID 1 MG: 1 TABLET ORAL at 08:06

## 2024-08-21 RX ADMIN — LISINOPRIL 5 MG: 10 TABLET ORAL at 08:06

## 2024-08-21 RX ADMIN — Medication 10 ML: at 08:07

## 2024-08-21 RX ADMIN — CARBIDOPA AND LEVODOPA 1 TABLET: 25; 250 TABLET ORAL at 12:43

## 2024-08-21 RX ADMIN — DIVALPROEX SODIUM 250 MG: 125 CAPSULE, COATED PELLETS ORAL at 08:07

## 2024-08-21 RX ADMIN — PROPRANOLOL HYDROCHLORIDE 120 MG: 60 CAPSULE, EXTENDED RELEASE ORAL at 08:06

## 2024-08-21 RX ADMIN — LEVOTHYROXINE SODIUM 25 MCG: 0.03 TABLET ORAL at 05:59

## 2024-08-21 RX ADMIN — CARBIDOPA AND LEVODOPA 1 TABLET: 25; 250 TABLET ORAL at 08:06

## 2024-08-21 NOTE — PLAN OF CARE
Goal Outcome Evaluation:  Plan of Care Reviewed With: durable power of         Progress: improving  Outcome Evaluation: discharge back to [facility] rehab via ems.

## 2024-08-21 NOTE — DISCHARGE SUMMARY
Physician Discharge Summary    Patient Identification  PATIENT IDENTIFICATION    Name: Vic Glez  :  1940  MRN: [id number]    Admit date: 2024    Discharge date: 2024     Admitting Physician: Peter Adame MD     Discharge Physician: Tom Hurley MD     Admission Diagnoses: Dysphagia [R13.10]  Pulmonary emboli [I26.99]    Hospital Problems:   Principal Problem:  Dysphagia   Active Problems:  Problems Addressed this Visit          Gastrointestinal Abdominal     * (Principal) Dysphagia - Primary     Other Visit Diagnoses       Difficulty walking              Diagnoses         Codes Comments    Oropharyngeal dysphagia    -  Primary ICD-10-CM: R13.12  ICD-9-CM: 787.22     Difficulty walking     ICD-10-CM: R26.2  ICD-9-CM: 719.7              Discharged Condition: stable    Consults: None    Imaging:   Imaging Results (Last 24 Hours)       ** No results found for the last 24 hours. **            Labs:   Lab Results (last 24 hours)       Procedure Component Value Units Date/Time    Urine Culture - Urine, Urine, Clean Catch [[id number]]  (Normal) Collected: 24 0507    Specimen: Urine, Clean Catch Updated: 24 1155     Urine Culture No growth    Basic Metabolic Panel [[id number]]  (Abnormal) Collected: 24    Specimen: Blood from Arm, Right Updated: 24     Glucose 96 mg/dL      BUN 17 mg/dL      Creatinine 0.86 mg/dL      Sodium 138 mmol/L      Potassium 3.8 mmol/L      Chloride 104 mmol/L      CO2 27.7 mmol/L      Calcium 8.5 mg/dL      BUN/Creatinine Ratio 19.8     Anion Gap 6.3 mmol/L      eGFR 86.5 mL/min/1.73     Narrative:      GFR Normal >60  Chronic Kidney Disease <60  Kidney Failure <15    The GFR formula is only valid for adults with stable renal function between ages 18 and 70.    Magnesium [[id number]]  (Normal) Collected: 24    Specimen: Blood from Arm, Right Updated: 2436     Magnesium 1.9 mg/dL     Phosphorus [[id number]]   (Normal) Collected: 08/21/24 0523    Specimen: Blood from Arm, Right Updated: 08/21/24 0636     Phosphorus 2.6 mg/dL     Hepatic Function Panel [[id number]]  (Abnormal) Collected: 08/21/24 0523    Specimen: Blood from Arm, Right Updated: 08/21/24 0636     Total Protein 5.6 g/dL      Albumin 2.6 g/dL      ALT (SGPT) 22 U/L      AST (SGOT) 33 U/L      Alkaline Phosphatase 47 U/L      Total Bilirubin 0.5 mg/dL      Bilirubin, Direct <0.2 mg/dL      Bilirubin, Indirect --     Comment: Unable to calculate       CBC & Differential [[id number]]  (Abnormal) Collected: 08/21/24 0523    Specimen: Blood from Arm, Right Updated: 08/21/24 0559    Narrative:      The following orders were created for panel order CBC & Differential.  Procedure                               Abnormality         Status                     ---------                               -----------         ------                     CBC Auto Differential[[id number]]        Abnormal            Final result                 Please view results for these tests on the individual orders.    CBC Auto Differential [[id number]]  (Abnormal) Collected: 08/21/24 0523    Specimen: Blood from Arm, Right Updated: 08/21/24 0559     WBC 6.29 10*3/mm3      RBC 4.02 10*6/mm3      Hemoglobin 12.1 g/dL      Hematocrit 36.6 %      MCV 91.0 fL      MCH 30.1 pg      MCHC 33.1 g/dL      RDW 12.3 %      RDW-SD 40.4 fl      MPV 10.3 fL      Platelets 234 10*3/mm3      Neutrophil % 62.5 %      Lymphocyte % 24.2 %      Monocyte % 7.9 %      Eosinophil % 3.0 %      Basophil % 1.0 %      Immature Grans % 1.4 %      Neutrophils, Absolute 3.93 10*3/mm3      Lymphocytes, Absolute 1.52 10*3/mm3      Monocytes, Absolute 0.50 10*3/mm3      Eosinophils, Absolute 0.19 10*3/mm3      Basophils, Absolute 0.06 10*3/mm3      Immature Grans, Absolute 0.09 10*3/mm3      nRBC 0.0 /100 WBC               Hospital Course:    84 y.o. male with past medical history of Parkinson's, dementia, DVT, GERD was  transferred to this facility from [place] for swallow evaluation due to dysphagia.  Patient initially presented outside facility from nursing home due to altered mental status, weakness and confusion and was found to have a UTI.  Patient was then found to have bilateral PEs and acute lower extremity DVT and thus started on therapeutic Lovenox.  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  Patient was found to have coughing and shortness of breath with any kind of intake including fluids.  Due to lack of speech therapy patient was transferred here for further management.  Patient was stable and saturating 100% prior to transfer.  At our facility labs were relatively unremarkable.  When seen patient was resting comfortably although with a significant resting tremor bilateral upper extremity.  Patient cannot give history due to his dementia.     Dysphagia  - SLP has evaluated -- diet modification recommended to mechanical soft and thin liquids  - seems to be tolerating     Recent Acute PE/DVT              - on tx lovenox -- can likely be switched to DOAC at facility  - should not go back on estradiol therapy     Parkinson's dementia  - Home meds resumed  - d/c'd [facility]     HTN  - home meds     Hypothyroidism  - home synthroid    Discharge Exam:  Gen: up in bed, in NAD  CV:  RRR, no m/r/g, no peripheral edema  Resp: CTAB, no increase work of breathing  Abd: soft, NT, ND, bs present  Neuro: moves all 4 ext, following commands  Ext: no clubbing, cyanosis or edema  Psych: AAOx1, pleasant affect    Disposition: Skilled nursing facility    Patient Discharge Medications:      Discharge Medications        New Medications        Instructions Start Date   Enoxaparin Sodium 80 MG/0.8ML solution prefilled syringe syringe  Commonly known as: LOVENOX   1 mg/kg (80 mg), Subcutaneous, Every 12 Hours             Continue These Medications        Instructions Start Date   acetaminophen 325  MG tablet  Commonly known as: TYLENOL   650 mg, Oral, Every 4 Hours PRN      atorvastatin 20 MG tablet  Commonly known as: LIPITOR   20 mg, Oral, Daily      carbidopa-levodopa  MG per tablet  Commonly known as: SINEMET   1 tablet, Oral, 4 Times Daily      Divalproex Sodium 125 MG capsule  Commonly known as: DEPAKOTE SPRINKLE   250 mg, Oral, 2 Times Daily      folic acid 1 MG tablet  Commonly known as: FOLVITE   1 mg, Oral, Daily      levothyroxine 25 MCG tablet  Commonly known as: SYNTHROID, LEVOTHROID   25 mcg, Oral, Daily      lisinopril 5 MG tablet  Commonly known as: PRINIVIL,ZESTRIL   5 mg, Enteral, Daily      propranolol  MG 24 hr capsule  Commonly known as: INDERAL LA   120 mg, Oral, Daily      vitamin B-12 1000 MCG tablet  Commonly known as: CYANOCOBALAMIN   1,000 mcg, Oral, Daily             Stop These Medications      estradiol 2 MG tablet  Commonly known as: ESTRACE             Follow-up Information       Jennifer Grayson MD .    Specialty: Family Medicine  Contact information:  [address] [place] [address] [phone]                                 Signed:  Paddy Hurley MD  [facility]  8/21/2024  13:13 EDT      I spent 31 minutes arranging and coordinating discharge and reviewing medications with majority of time spent counseling patient

## 2024-08-21 NOTE — THERAPY TREATMENT NOTE
Acute Care - Speech Language Pathology   Swallow Treatment Note KARY Garner     Patient Name: Vic Glez  : 1940  MRN: [id number]  Today's Date: 2024               Admit Date: 2024    Visit Dx:     ICD-10-CM ICD-9-CM   1. Oropharyngeal dysphagia  R13.12 787.22   2. Difficulty walking  R26.2 719.7     Patient Active Problem List   Diagnosis    Dysphagia    Pulmonary emboli    Parkinson's disease dementia     Past Medical History:   Diagnosis Date    CKD (chronic kidney disease)     Dementia     DVT (deep vein thrombosis) in pregnancy     Hypertension     Parkinson's disease      History reviewed. No pertinent surgical history.    SLP Recommendation and Plan      SPEECH PATHOLOGY DYSPHAGIA TREATMENT    Subjective/Behavioral Observations: Awake, cooperative, attempting to feed self. Nursing not reporting any swallowing issues since began diet. Tolerating meds in pudding per nursing.         Day/time of Treatment:24        Current Diet:mechanical soft, thin liquids        Current Strategies:assist for feeding, meds crushed in pudding or applesauce, items cut small, controlled drink        Treatment received:focused on dysphagia goals        Results of treatment:Patient assisted to feed self. Cough x1 with free drinking of thin liquids by straw. Hand over hand assist for single sips with no further s/s of aspiration. Consumed approx 240ml of thin liquids. Mechanical soft solids with assist for set up and hand over hand assist for hand to mouth. Adequate mastication with soft solids. Mild delayed swallows however no overt s/s of aspiration.         Progress toward goals:progressing        Barriers to Achieving goals: medical status        Plan of care:/changes in plan: continue current diet recommendations, strategies and positioning to decrease aspiration risk.                                 Anticipated Discharge Disposition (SLP): anticipate therapy at next level of care (24 1218)                                                                EDUCATION  The patient has been educated in the following areas:   Dysphagia (Swallowing Impairment).                Time Calculation:    Time Calculation- SLP       Row Name 08/21/24 1053             Time Calculation- SLP    SLP Stop Time 0945  -SN      SLP Received On 08/21/24  -SN         Untimed Charges    89931-UB Treatment Swallow Minutes 45  -SN         Total Minutes    Untimed Charges Total Minutes 45  -SN       Total Minutes 45  -SN                User Key  (r) = Recorded By, (t) = Taken By, (c) = Cosigned By      Initials Name Provider Type    Jolanta Barber MS-CCC/SLP, RENATE Speech and Language Pathologist                    Therapy Charges for Today       Code Description Service Date Service Provider Modifiers Qty    15466773662 HC ST MOTION FLUORO EVAL SWALLOW 6 8/20/2024 Jolanta Corey MS-CCC/SLP, CNT GN 1    [id number] HC ST EVAL ORAL PHARYNG SWALLOW 4 8/20/2024 Jolanta Corey MS-CCC/SLP, CNT GN 1    [id number] HC ST TREATMENT SWALLOW 3 8/21/2024 Jolanta Corey MS-CCC/SLP, CNT GN 1                 KAE Garcia/CRISTO, RENATE  8/21/2024

## 2024-08-21 NOTE — PLAN OF CARE
"Goal Outcome Evaluation:  Plan of Care Reviewed With: patient        Progress: improving     Pt more awake and alert this shift. Has rested well in between nursing care. Pt taking all po meds great with pudding. Pt's nephew came by at start of shift and gave this nurse a copy of living will. Password is \"[email or web address]\" for anyone inquiring about the pt. Copy placed under Josiane\"s door with all information needed.                             "

## 2024-08-21 NOTE — PROGRESS NOTES
"DAILY PROGRESS NOTE  HOSPITALIST GROUP      PATIENT IDENTIFICATION    Name: Vic Glez  :  1940  MRN: [id number]    CHIEF COMPLAINT/PRINCIPAL DIAGNOSIS: Dysphagia       SUBJECTIVE    Denies any issues. No issues per nursing. No fevers. Hauser in place    ROS:   Gen: No fever or chills  CV: no chest pain or palpitation  Resp: no shortness of breath or cough  GI: no nausea, vomiting, diarrhea  Neuro: no headache or dizziness     OBJECTIVE     Exam:  BP (!) 144/102 (BP Location: Right arm, Patient Position: Lying) Comment: RN Aware  Pulse 83   Temp 97.7 °F (36.5 °C) (Oral)   Resp (!) 38 Comment: RN Aware  Ht 180.3 cm (71\")   Wt 78.9 kg (173 lb 15.1 oz)   SpO2 92%   BMI 24.26 kg/m²   Intake/Output last 24 hours:    Intake/Output Summary (Last 24 hours) at 2024 1115  Last data filed at 2024 0600  Gross per 24 hour   Intake 1330 ml   Output 900 ml   Net 430 ml        Gen: NAD, up in bed  Resp: CTAB, no increased work of breathing  CV: RRR, no m/r/g. No peripheral edema  GI: Soft, nontender, (+) BS. nondistended  Psych: Alert and Oriented x 1, Mood and affect appropriate to situation  Skin: warm and dry on palpation. No rash on inspection.  Neuro: moves all 4 extremities, follows simple commands    DATA REVIEW:  Lab Results (last 24 hours)       Procedure Component Value Units Date/Time    Basic Metabolic Panel [[id number]]  (Abnormal) Collected: 24    Specimen: Blood from Arm, Right Updated: 24     Glucose 96 mg/dL      BUN 17 mg/dL      Creatinine 0.86 mg/dL      Sodium 138 mmol/L      Potassium 3.8 mmol/L      Chloride 104 mmol/L      CO2 27.7 mmol/L      Calcium 8.5 mg/dL      BUN/Creatinine Ratio 19.8     Anion Gap 6.3 mmol/L      eGFR 86.5 mL/min/1.73     Narrative:      GFR Normal >60  Chronic Kidney Disease <60  Kidney Failure <15    The GFR formula is only valid for adults with stable renal function between ages 18 and 70.    Magnesium [[id number]]  (Normal) Collected: " 08/21/24 0523    Specimen: Blood from Arm, Right Updated: 08/21/24 0636     Magnesium 1.9 mg/dL     Phosphorus [[id number]]  (Normal) Collected: 08/21/24 0523    Specimen: Blood from Arm, Right Updated: 08/21/24 0636     Phosphorus 2.6 mg/dL     Hepatic Function Panel [[id number]]  (Abnormal) Collected: 08/21/24 0523    Specimen: Blood from Arm, Right Updated: 08/21/24 0636     Total Protein 5.6 g/dL      Albumin 2.6 g/dL      ALT (SGPT) 22 U/L      AST (SGOT) 33 U/L      Alkaline Phosphatase 47 U/L      Total Bilirubin 0.5 mg/dL      Bilirubin, Direct <0.2 mg/dL      Bilirubin, Indirect --     Comment: Unable to calculate       CBC & Differential [[id number]]  (Abnormal) Collected: 08/21/24 0523    Specimen: Blood from Arm, Right Updated: 08/21/24 0559    Narrative:      The following orders were created for panel order CBC & Differential.  Procedure                               Abnormality         Status                     ---------                               -----------         ------                     CBC Auto Differential[[id number]]        Abnormal            Final result                 Please view results for these tests on the individual orders.    CBC Auto Differential [[id number]]  (Abnormal) Collected: 08/21/24 0523    Specimen: Blood from Arm, Right Updated: 08/21/24 0559     WBC 6.29 10*3/mm3      RBC 4.02 10*6/mm3      Hemoglobin 12.1 g/dL      Hematocrit 36.6 %      MCV 91.0 fL      MCH 30.1 pg      MCHC 33.1 g/dL      RDW 12.3 %      RDW-SD 40.4 fl      MPV 10.3 fL      Platelets 234 10*3/mm3      Neutrophil % 62.5 %      Lymphocyte % 24.2 %      Monocyte % 7.9 %      Eosinophil % 3.0 %      Basophil % 1.0 %      Immature Grans % 1.4 %      Neutrophils, Absolute 3.93 10*3/mm3      Lymphocytes, Absolute 1.52 10*3/mm3      Monocytes, Absolute 0.50 10*3/mm3      Eosinophils, Absolute 0.19 10*3/mm3      Basophils, Absolute 0.06 10*3/mm3      Immature Grans, Absolute 0.09 10*3/mm3      nRBC 0.0 /100  WBC             Imaging Results (Last 24 Hours)       Procedure Component Value Units Date/Time    FL Video Swallow With Speech Single Contrast [[id number]] Resulted: 08/20/24 1228     Updated: 08/20/24 1232            Labs and imaging noted above have been personally reviewed by me.    Scheduled Meds:atorvastatin, 20 mg, Oral, Nightly  carbidopa-levodopa, 1 tablet, Oral, 4x Daily  Divalproex Sodium, 250 mg, Oral, BID  enoxaparin, 1 mg/kg, Subcutaneous, Q12H  folic acid, 1 mg, Oral, Daily  levothyroxine, 25 mcg, Oral, Q AM  lisinopril, 5 mg, Oral, Q24H  propranolol LA, 120 mg, Oral, Daily  sodium chloride, 10 mL, Intravenous, Q12H      Continuous Infusions:   PRN Meds:  senna-docusate sodium **AND** polyethylene glycol **AND** bisacodyl **AND** bisacodyl    ondansetron    sodium chloride    sodium chloride    ASSESSMENT/PLAN      Dysphagia    Pulmonary emboli    Parkinson's disease dementia    Dysphagia  - SLP has evaluated - -diet modification recommended  - seems to be tolerating    Recent Acute PE/DVT   - on tx lovenox    Parkinson's dementia  - Home meds resumed  - remove greenfield    HTN  - home meds    Hypothyroidism  - home synthroid      Nutrition - Diet: Regular/House; Texture: Mechanical Ground (NDD 2); Fluid Consistency: Thin (IDDSI 0)   DVT Prophylaxis - lovenox  Code Status - full   GI ppx - none  Disposition - d/c today if able       Paddy Hurley MD  Hospitalist Group  8/21/2024  11:15 EDT

## 2024-09-08 ENCOUNTER — HOSPITAL ENCOUNTER (INPATIENT)
Facility: HOSPITAL | Age: 84
LOS: 5 days | Discharge: NURSING, REHAB OR LONG-TERM CARE | End: 2024-09-13
Attending: FAMILY MEDICINE | Admitting: STUDENT IN AN ORGANIZED HEALTH CARE EDUCATION/TRAINING PROGRAM
Payer: MEDICARE

## 2024-09-08 ENCOUNTER — APPOINTMENT (OUTPATIENT)
Dept: GENERAL RADIOLOGY | Facility: HOSPITAL | Age: 84
End: 2024-09-08
Payer: MEDICARE

## 2024-09-08 DIAGNOSIS — R26.2 DIFFICULTY WALKING: Primary | ICD-10-CM

## 2024-09-08 PROBLEM — R56.9 SEIZURE: Status: ACTIVE | Noted: 2024-09-08

## 2024-09-08 LAB
ALBUMIN SERPL-MCNC: 3.1 G/DL (ref 3.5–5.2)
ALBUMIN/GLOB SERPL: 1 G/DL
ALP SERPL-CCNC: 45 U/L (ref 39–117)
ALT SERPL W P-5'-P-CCNC: <5 U/L (ref 1–41)
ANION GAP SERPL CALCULATED.3IONS-SCNC: 7.8 MMOL/L (ref 5–15)
AST SERPL-CCNC: 12 U/L (ref 1–40)
BASOPHILS # BLD AUTO: 0.06 10*3/MM3 (ref 0–0.2)
BASOPHILS NFR BLD AUTO: 0.9 % (ref 0–1.5)
BILIRUB SERPL-MCNC: 0.3 MG/DL (ref 0–1.2)
BUN SERPL-MCNC: 17 MG/DL (ref 8–23)
BUN/CREAT SERPL: 17.7 (ref 7–25)
CALCIUM SPEC-SCNC: 8.9 MG/DL (ref 8.6–10.5)
CHLORIDE SERPL-SCNC: 105 MMOL/L (ref 98–107)
CO2 SERPL-SCNC: 25.2 MMOL/L (ref 22–29)
CREAT SERPL-MCNC: 0.96 MG/DL (ref 0.76–1.27)
D-LACTATE SERPL-SCNC: 0.9 MMOL/L (ref 0.5–2)
DEPRECATED RDW RBC AUTO: 49.1 FL (ref 37–54)
EGFRCR SERPLBLD CKD-EPI 2021: 78.9 ML/MIN/1.73
EOSINOPHIL # BLD AUTO: 0.32 10*3/MM3 (ref 0–0.4)
EOSINOPHIL NFR BLD AUTO: 4.5 % (ref 0.3–6.2)
ERYTHROCYTE [DISTWIDTH] IN BLOOD BY AUTOMATED COUNT: 14.4 % (ref 12.3–15.4)
GEN 5 2HR TROPONIN T REFLEX: 46 NG/L
GLOBULIN UR ELPH-MCNC: 3.2 GM/DL
GLUCOSE SERPL-MCNC: 99 MG/DL (ref 65–99)
HCT VFR BLD AUTO: 33.8 % (ref 37.5–51)
HGB BLD-MCNC: 10.8 G/DL (ref 13–17.7)
IMM GRANULOCYTES # BLD AUTO: 0.08 10*3/MM3 (ref 0–0.05)
IMM GRANULOCYTES NFR BLD AUTO: 1.1 % (ref 0–0.5)
INR PPP: 1.41 (ref 0.86–1.15)
LYMPHOCYTES # BLD AUTO: 1.38 10*3/MM3 (ref 0.7–3.1)
LYMPHOCYTES NFR BLD AUTO: 19.6 % (ref 19.6–45.3)
MAGNESIUM SERPL-MCNC: 1.9 MG/DL (ref 1.6–2.4)
MCH RBC QN AUTO: 30.1 PG (ref 26.6–33)
MCHC RBC AUTO-ENTMCNC: 32 G/DL (ref 31.5–35.7)
MCV RBC AUTO: 94.2 FL (ref 79–97)
MONOCYTES # BLD AUTO: 0.71 10*3/MM3 (ref 0.1–0.9)
MONOCYTES NFR BLD AUTO: 10.1 % (ref 5–12)
NEUTROPHILS NFR BLD AUTO: 4.5 10*3/MM3 (ref 1.7–7)
NEUTROPHILS NFR BLD AUTO: 63.8 % (ref 42.7–76)
NRBC BLD AUTO-RTO: 0 /100 WBC (ref 0–0.2)
NT-PROBNP SERPL-MCNC: 888 PG/ML (ref 0–1800)
PHOSPHATE SERPL-MCNC: 3.2 MG/DL (ref 2.5–4.5)
PLATELET # BLD AUTO: 189 10*3/MM3 (ref 140–450)
PMV BLD AUTO: 10.4 FL (ref 6–12)
POTASSIUM SERPL-SCNC: 4.4 MMOL/L (ref 3.5–5.2)
PROCALCITONIN SERPL-MCNC: 0.08 NG/ML (ref 0–0.25)
PROT SERPL-MCNC: 6.3 G/DL (ref 6–8.5)
PROTHROMBIN TIME: 17.5 SECONDS (ref 11.8–14.9)
RBC # BLD AUTO: 3.59 10*6/MM3 (ref 4.14–5.8)
SODIUM SERPL-SCNC: 138 MMOL/L (ref 136–145)
TROPONIN T DELTA: -2 NG/L
TROPONIN T SERPL HS-MCNC: 48 NG/L
WBC NRBC COR # BLD AUTO: 7.05 10*3/MM3 (ref 3.4–10.8)

## 2024-09-08 PROCEDURE — 71045 X-RAY EXAM CHEST 1 VIEW: CPT

## 2024-09-08 PROCEDURE — 85025 COMPLETE CBC W/AUTO DIFF WBC: CPT | Performed by: FAMILY MEDICINE

## 2024-09-08 PROCEDURE — 80053 COMPREHEN METABOLIC PANEL: CPT | Performed by: FAMILY MEDICINE

## 2024-09-08 PROCEDURE — 84145 PROCALCITONIN (PCT): CPT | Performed by: PHYSICIAN ASSISTANT

## 2024-09-08 PROCEDURE — 84484 ASSAY OF TROPONIN QUANT: CPT | Performed by: FAMILY MEDICINE

## 2024-09-08 PROCEDURE — 83605 ASSAY OF LACTIC ACID: CPT | Performed by: FAMILY MEDICINE

## 2024-09-08 PROCEDURE — 85610 PROTHROMBIN TIME: CPT | Performed by: FAMILY MEDICINE

## 2024-09-08 PROCEDURE — 84443 ASSAY THYROID STIM HORMONE: CPT | Performed by: STUDENT IN AN ORGANIZED HEALTH CARE EDUCATION/TRAINING PROGRAM

## 2024-09-08 PROCEDURE — 87040 BLOOD CULTURE FOR BACTERIA: CPT | Performed by: FAMILY MEDICINE

## 2024-09-08 PROCEDURE — 84100 ASSAY OF PHOSPHORUS: CPT | Performed by: FAMILY MEDICINE

## 2024-09-08 PROCEDURE — 99222 1ST HOSP IP/OBS MODERATE 55: CPT | Performed by: STUDENT IN AN ORGANIZED HEALTH CARE EDUCATION/TRAINING PROGRAM

## 2024-09-08 PROCEDURE — 83735 ASSAY OF MAGNESIUM: CPT | Performed by: FAMILY MEDICINE

## 2024-09-08 PROCEDURE — 83880 ASSAY OF NATRIURETIC PEPTIDE: CPT | Performed by: FAMILY MEDICINE

## 2024-09-08 RX ORDER — AMOXICILLIN 250 MG
2 CAPSULE ORAL 2 TIMES DAILY PRN
Status: DISCONTINUED | OUTPATIENT
Start: 2024-09-08 | End: 2024-09-13 | Stop reason: HOSPADM

## 2024-09-08 RX ORDER — DIVALPROEX SODIUM 125 MG/1
250 CAPSULE, COATED PELLETS ORAL 2 TIMES DAILY
Status: DISCONTINUED | OUTPATIENT
Start: 2024-09-09 | End: 2024-09-13 | Stop reason: HOSPADM

## 2024-09-08 RX ORDER — ATORVASTATIN CALCIUM 20 MG/1
20 TABLET, FILM COATED ORAL DAILY
Status: DISCONTINUED | OUTPATIENT
Start: 2024-09-09 | End: 2024-09-13 | Stop reason: HOSPADM

## 2024-09-08 RX ORDER — SODIUM CHLORIDE 0.9 % (FLUSH) 0.9 %
10 SYRINGE (ML) INJECTION AS NEEDED
Status: DISCONTINUED | OUTPATIENT
Start: 2024-09-08 | End: 2024-09-13 | Stop reason: HOSPADM

## 2024-09-08 RX ORDER — LORAZEPAM 2 MG/ML
2 INJECTION INTRAMUSCULAR EVERY 4 HOURS PRN
Status: DISCONTINUED | OUTPATIENT
Start: 2024-09-08 | End: 2024-09-12

## 2024-09-08 RX ORDER — BISACODYL 5 MG/1
5 TABLET, DELAYED RELEASE ORAL DAILY PRN
Status: DISCONTINUED | OUTPATIENT
Start: 2024-09-08 | End: 2024-09-13 | Stop reason: HOSPADM

## 2024-09-08 RX ORDER — UREA 10 %
1000 LOTION (ML) TOPICAL DAILY
Status: DISCONTINUED | OUTPATIENT
Start: 2024-09-09 | End: 2024-09-13 | Stop reason: HOSPADM

## 2024-09-08 RX ORDER — POLYETHYLENE GLYCOL 3350 17 G/17G
17 POWDER, FOR SOLUTION ORAL DAILY PRN
Status: DISCONTINUED | OUTPATIENT
Start: 2024-09-08 | End: 2024-09-13 | Stop reason: HOSPADM

## 2024-09-08 RX ORDER — SODIUM CHLORIDE 9 MG/ML
40 INJECTION, SOLUTION INTRAVENOUS AS NEEDED
Status: DISCONTINUED | OUTPATIENT
Start: 2024-09-08 | End: 2024-09-13 | Stop reason: HOSPADM

## 2024-09-08 RX ORDER — FOLIC ACID 1 MG/1
1 TABLET ORAL DAILY
Status: DISCONTINUED | OUTPATIENT
Start: 2024-09-09 | End: 2024-09-13 | Stop reason: HOSPADM

## 2024-09-08 RX ORDER — LISINOPRIL 5 MG/1
5 TABLET ORAL DAILY
Status: DISCONTINUED | OUTPATIENT
Start: 2024-09-09 | End: 2024-09-13 | Stop reason: HOSPADM

## 2024-09-08 RX ORDER — SODIUM CHLORIDE 0.9 % (FLUSH) 0.9 %
10 SYRINGE (ML) INJECTION EVERY 12 HOURS SCHEDULED
Status: DISCONTINUED | OUTPATIENT
Start: 2024-09-09 | End: 2024-09-13 | Stop reason: HOSPADM

## 2024-09-08 RX ORDER — CARBIDOPA/LEVODOPA 25MG-250MG
1 TABLET ORAL 4 TIMES DAILY
Status: DISCONTINUED | OUTPATIENT
Start: 2024-09-09 | End: 2024-09-13 | Stop reason: HOSPADM

## 2024-09-08 RX ORDER — PROPRANOLOL HYDROCHLORIDE 60 MG/1
60 TABLET ORAL 2 TIMES DAILY
COMMUNITY
Start: 2024-08-19 | End: 2025-08-20

## 2024-09-08 RX ORDER — NITROGLYCERIN 0.4 MG/1
0.4 TABLET SUBLINGUAL
Status: DISCONTINUED | OUTPATIENT
Start: 2024-09-08 | End: 2024-09-13 | Stop reason: HOSPADM

## 2024-09-08 RX ORDER — LORATADINE 10 MG/1
10 TABLET ORAL DAILY
COMMUNITY

## 2024-09-08 RX ORDER — LEVOTHYROXINE SODIUM 25 UG/1
25 TABLET ORAL
Status: DISCONTINUED | OUTPATIENT
Start: 2024-09-09 | End: 2024-09-13 | Stop reason: HOSPADM

## 2024-09-08 RX ORDER — CETIRIZINE HYDROCHLORIDE 10 MG/1
10 TABLET ORAL DAILY
Status: DISCONTINUED | OUTPATIENT
Start: 2024-09-09 | End: 2024-09-13 | Stop reason: HOSPADM

## 2024-09-08 RX ORDER — AZITHROMYCIN 500 MG/1
500 TABLET, FILM COATED ORAL ONCE
COMMUNITY
End: 2024-09-13 | Stop reason: HOSPADM

## 2024-09-08 RX ORDER — BISACODYL 10 MG
10 SUPPOSITORY, RECTAL RECTAL DAILY PRN
Status: DISCONTINUED | OUTPATIENT
Start: 2024-09-08 | End: 2024-09-13 | Stop reason: HOSPADM

## 2024-09-08 RX ORDER — ALUMINA, MAGNESIA, AND SIMETHICONE 2400; 2400; 240 MG/30ML; MG/30ML; MG/30ML
15 SUSPENSION ORAL EVERY 6 HOURS PRN
Status: DISCONTINUED | OUTPATIENT
Start: 2024-09-08 | End: 2024-09-13 | Stop reason: HOSPADM

## 2024-09-08 RX ORDER — SODIUM CHLORIDE 9 MG/ML
75 INJECTION, SOLUTION INTRAVENOUS CONTINUOUS
Status: ACTIVE | OUTPATIENT
Start: 2024-09-09 | End: 2024-09-09

## 2024-09-08 NOTE — PROGRESS NOTES
84-year-old male currently at [facility] for AMS, with history of Parkinson's dementia, DVT, GERD, hypertension, hypothyroidism, dysphagia, requesting transfer for neurological episodes, whereby he becomes nonverbal, has tremors, diaphoretic, shakes his head, he comes tachycardic and has labored breathing, ? fevers, broad spectrum antibiotics started, workup for fevers negative, lactate was elevated, episodes last about 30 minutes, post episode, he cannot communicate, verbally or speak, after a while he is back to himself, questionable seizures, requesting transfer to our facility for evaluation with neurology and workup.  Accepted patient for neurology workup, consult neurology upon arrival to the floor.      Electronically signed by Pierre Singletary MD, 9/8/2024, 15:42 EDT.    Portions of this documentation were transcribed electronically from a voice recognition software.  I confirm all data accurately represents the service(s) I performed at today's visit.

## 2024-09-08 NOTE — NURSING NOTE
Pt newly admitted to floor from [facility]. Received report from Jenni. Pt is alert and oriented. Pt is on room air. No complaints of pain or discomfort. Continue with plan of care.

## 2024-09-09 ENCOUNTER — APPOINTMENT (OUTPATIENT)
Dept: NEUROLOGY | Facility: HOSPITAL | Age: 84
End: 2024-09-09
Payer: MEDICARE

## 2024-09-09 LAB
AMMONIA BLD-SCNC: 34 UMOL/L (ref 16–60)
ANION GAP SERPL CALCULATED.3IONS-SCNC: 8.5 MMOL/L (ref 5–15)
BASOPHILS # BLD AUTO: 0.07 10*3/MM3 (ref 0–0.2)
BASOPHILS NFR BLD AUTO: 1 % (ref 0–1.5)
BUN SERPL-MCNC: 19 MG/DL (ref 8–23)
BUN/CREAT SERPL: 20.4 (ref 7–25)
CALCIUM SPEC-SCNC: 9 MG/DL (ref 8.6–10.5)
CHLORIDE SERPL-SCNC: 108 MMOL/L (ref 98–107)
CO2 SERPL-SCNC: 22.5 MMOL/L (ref 22–29)
CREAT SERPL-MCNC: 0.93 MG/DL (ref 0.76–1.27)
DEPRECATED RDW RBC AUTO: 47.1 FL (ref 37–54)
EGFRCR SERPLBLD CKD-EPI 2021: 82 ML/MIN/1.73
EOSINOPHIL # BLD AUTO: 0.28 10*3/MM3 (ref 0–0.4)
EOSINOPHIL NFR BLD AUTO: 4 % (ref 0.3–6.2)
ERYTHROCYTE [DISTWIDTH] IN BLOOD BY AUTOMATED COUNT: 14.1 % (ref 12.3–15.4)
GLUCOSE SERPL-MCNC: 100 MG/DL (ref 65–99)
HCT VFR BLD AUTO: 35 % (ref 37.5–51)
HGB BLD-MCNC: 11.5 G/DL (ref 13–17.7)
IMM GRANULOCYTES # BLD AUTO: 0.07 10*3/MM3 (ref 0–0.05)
IMM GRANULOCYTES NFR BLD AUTO: 1 % (ref 0–0.5)
LYMPHOCYTES # BLD AUTO: 1.87 10*3/MM3 (ref 0.7–3.1)
LYMPHOCYTES NFR BLD AUTO: 27 % (ref 19.6–45.3)
MAGNESIUM SERPL-MCNC: 1.9 MG/DL (ref 1.6–2.4)
MCH RBC QN AUTO: 30.4 PG (ref 26.6–33)
MCHC RBC AUTO-ENTMCNC: 32.9 G/DL (ref 31.5–35.7)
MCV RBC AUTO: 92.6 FL (ref 79–97)
MONOCYTES # BLD AUTO: 0.65 10*3/MM3 (ref 0.1–0.9)
MONOCYTES NFR BLD AUTO: 9.4 % (ref 5–12)
MRSA DNA SPEC QL NAA+PROBE: NORMAL
NEUTROPHILS NFR BLD AUTO: 3.98 10*3/MM3 (ref 1.7–7)
NEUTROPHILS NFR BLD AUTO: 57.6 % (ref 42.7–76)
NRBC BLD AUTO-RTO: 0 /100 WBC (ref 0–0.2)
PLATELET # BLD AUTO: 203 10*3/MM3 (ref 140–450)
PMV BLD AUTO: 10 FL (ref 6–12)
POTASSIUM SERPL-SCNC: 4.6 MMOL/L (ref 3.5–5.2)
RBC # BLD AUTO: 3.78 10*6/MM3 (ref 4.14–5.8)
SODIUM SERPL-SCNC: 139 MMOL/L (ref 136–145)
TSH SERPL DL<=0.05 MIU/L-ACNC: 8.49 UIU/ML (ref 0.27–4.2)
VIT B12 BLD-MCNC: 1302 PG/ML (ref 211–946)
WBC NRBC COR # BLD AUTO: 6.92 10*3/MM3 (ref 3.4–10.8)

## 2024-09-09 PROCEDURE — 87641 MR-STAPH DNA AMP PROBE: CPT | Performed by: STUDENT IN AN ORGANIZED HEALTH CARE EDUCATION/TRAINING PROGRAM

## 2024-09-09 PROCEDURE — 95816 EEG AWAKE AND DROWSY: CPT

## 2024-09-09 PROCEDURE — 25010000002 VANCOMYCIN 5 G RECONSTITUTED SOLUTION: Performed by: STUDENT IN AN ORGANIZED HEALTH CARE EDUCATION/TRAINING PROGRAM

## 2024-09-09 PROCEDURE — 25010000002 PIPERACILLIN SOD-TAZOBACTAM PER 1 G: Performed by: STUDENT IN AN ORGANIZED HEALTH CARE EDUCATION/TRAINING PROGRAM

## 2024-09-09 PROCEDURE — 99232 SBSQ HOSP IP/OBS MODERATE 35: CPT | Performed by: INTERNAL MEDICINE

## 2024-09-09 PROCEDURE — 82607 VITAMIN B-12: CPT | Performed by: STUDENT IN AN ORGANIZED HEALTH CARE EDUCATION/TRAINING PROGRAM

## 2024-09-09 PROCEDURE — 99222 1ST HOSP IP/OBS MODERATE 55: CPT | Performed by: FAMILY MEDICINE

## 2024-09-09 PROCEDURE — 25810000003 SODIUM CHLORIDE 0.9 % SOLUTION: Performed by: STUDENT IN AN ORGANIZED HEALTH CARE EDUCATION/TRAINING PROGRAM

## 2024-09-09 PROCEDURE — 83735 ASSAY OF MAGNESIUM: CPT | Performed by: STUDENT IN AN ORGANIZED HEALTH CARE EDUCATION/TRAINING PROGRAM

## 2024-09-09 PROCEDURE — 80048 BASIC METABOLIC PNL TOTAL CA: CPT | Performed by: STUDENT IN AN ORGANIZED HEALTH CARE EDUCATION/TRAINING PROGRAM

## 2024-09-09 PROCEDURE — 82140 ASSAY OF AMMONIA: CPT | Performed by: STUDENT IN AN ORGANIZED HEALTH CARE EDUCATION/TRAINING PROGRAM

## 2024-09-09 PROCEDURE — 85025 COMPLETE CBC W/AUTO DIFF WBC: CPT | Performed by: STUDENT IN AN ORGANIZED HEALTH CARE EDUCATION/TRAINING PROGRAM

## 2024-09-09 PROCEDURE — 86738 MYCOPLASMA ANTIBODY: CPT | Performed by: INTERNAL MEDICINE

## 2024-09-09 RX ADMIN — APIXABAN 5 MG: 5 TABLET, FILM COATED ORAL at 08:47

## 2024-09-09 RX ADMIN — PIPERACILLIN AND TAZOBACTAM 3.38 G: 3; .375 INJECTION, POWDER, FOR SOLUTION INTRAVENOUS at 15:11

## 2024-09-09 RX ADMIN — Medication 10 ML: at 00:09

## 2024-09-09 RX ADMIN — SODIUM CHLORIDE 75 ML/HR: 9 INJECTION, SOLUTION INTRAVENOUS at 00:09

## 2024-09-09 RX ADMIN — DIVALPROEX SODIUM 250 MG: 125 CAPSULE, COATED PELLETS ORAL at 20:30

## 2024-09-09 RX ADMIN — DIVALPROEX SODIUM 250 MG: 125 CAPSULE, COATED PELLETS ORAL at 00:08

## 2024-09-09 RX ADMIN — CYANOCOBALAMIN TAB 500 MCG 1000 MCG: 500 TAB at 08:47

## 2024-09-09 RX ADMIN — CARBIDOPA AND LEVODOPA 1 TABLET: 25; 250 TABLET ORAL at 00:08

## 2024-09-09 RX ADMIN — CARBIDOPA AND LEVODOPA 1 TABLET: 25; 250 TABLET ORAL at 12:32

## 2024-09-09 RX ADMIN — Medication 10 ML: at 20:30

## 2024-09-09 RX ADMIN — APIXABAN 5 MG: 5 TABLET, FILM COATED ORAL at 20:30

## 2024-09-09 RX ADMIN — CETIRIZINE HYDROCHLORIDE 10 MG: 10 TABLET, FILM COATED ORAL at 08:47

## 2024-09-09 RX ADMIN — PIPERACILLIN AND TAZOBACTAM 3.38 G: 3; .375 INJECTION, POWDER, FOR SOLUTION INTRAVENOUS at 22:11

## 2024-09-09 RX ADMIN — PROPRANOLOL HYDROCHLORIDE 60 MG: 40 TABLET ORAL at 00:08

## 2024-09-09 RX ADMIN — PROPRANOLOL HYDROCHLORIDE 60 MG: 40 TABLET ORAL at 08:47

## 2024-09-09 RX ADMIN — CARBIDOPA AND LEVODOPA 1 TABLET: 25; 250 TABLET ORAL at 17:51

## 2024-09-09 RX ADMIN — DIVALPROEX SODIUM 250 MG: 125 CAPSULE, COATED PELLETS ORAL at 08:48

## 2024-09-09 RX ADMIN — FOLIC ACID 1 MG: 1 TABLET ORAL at 08:48

## 2024-09-09 RX ADMIN — LISINOPRIL 5 MG: 5 TABLET ORAL at 08:47

## 2024-09-09 RX ADMIN — ATORVASTATIN CALCIUM 20 MG: 20 TABLET, FILM COATED ORAL at 08:48

## 2024-09-09 RX ADMIN — CARBIDOPA AND LEVODOPA 1 TABLET: 25; 250 TABLET ORAL at 20:30

## 2024-09-09 RX ADMIN — LEVOTHYROXINE SODIUM 25 MCG: 0.03 TABLET ORAL at 05:23

## 2024-09-09 RX ADMIN — PROPRANOLOL HYDROCHLORIDE 60 MG: 40 TABLET ORAL at 20:30

## 2024-09-09 RX ADMIN — VANCOMYCIN HYDROCHLORIDE 750 MG: 5 INJECTION, POWDER, LYOPHILIZED, FOR SOLUTION INTRAVENOUS at 00:10

## 2024-09-09 RX ADMIN — CARBIDOPA AND LEVODOPA 1 TABLET: 25; 250 TABLET ORAL at 08:47

## 2024-09-09 RX ADMIN — PIPERACILLIN AND TAZOBACTAM 3.38 G: 3; .375 INJECTION, POWDER, FOR SOLUTION INTRAVENOUS at 06:17

## 2024-09-09 RX ADMIN — VANCOMYCIN HYDROCHLORIDE 750 MG: 5 INJECTION, POWDER, LYOPHILIZED, FOR SOLUTION INTRAVENOUS at 12:32

## 2024-09-09 NOTE — CONSULTS
[facility]  [facility] TeleNeurology Consult Services    Routine Consult New    Patient Name:   Vic Glez  YOB: 1940  Identification Number:   MRN - [id number]  Date of Service:   09/09/2024 08:53:33    Diagnosis        G93.41 - Encephalopathy Metabolic        F05.0 - Delirium not superimposed on dementia, so described    Impression  Idiopathic Parkinson's disease.    Patient bradykinetic, tremulous on examination this morning. Follows two step commands reliably.    Patient showing signs of delirium in the setting of metabolic encephalopathy likely d/t ifnectious state, antibiotics, notable elevations to TSH.  Less likely seizure activity but will evaluate and follow.    Recommendations:  - Evaluation of hypothyroidism and synthroid dose per primary team.  - rEEG not unreasonable. Will follow.  - Hold MRI at this time. Recently performed w/ no acute changes  - Further infectious/metabolic w/u per primary team.  - Delirium precautions.  - Continue eliquis.  - Continue home sinemet  -- Will need in person examination by neurologist to fully evaluate parkinsonian features for medication adjustment PRN. Recommend outpatient f/u after acute phase.    Our recommendations are outlined below    Nursing Recommendations :  Delirium precautions: Blinds open during the day, closed at night, frequent reorientation, minimize nighttime interruptionsWhen possible avoid benzodiazepines, opioid pain medications, and anticholinergic medications    Consultations :  Toxic metabolic work up per primary teamID consultation for assistance with management if available    DVT Prophylaxis :  Choice of Primary Team    Disposition :  Neurology will follow    ----------------------------------------------------------------------------------------------------    Imaging  MRI Brain 9/4  There is moderate cerebral atrophy with widening of the extra-axial spaces and ventricular dilatation.  There are multiple white  matter hyperintensities, distributed throughout the deep white matter tracts of the cerebral hemispheres, consistent with moderate chronic white matter ischemic changes.  There is no evidence for recent intracranial ischemia or other cause of cytotoxic edema on diffusion weighted imaging (DWI).  Normal T2* images of the brain without demonstrated susceptibility artifact.    Labs  TSH 8.5  Ammonia WNL    Chief Complaint:  Transient AMS.    History of Present Illness:  Patient is a 84 year old Male.  Patient seen at bedside. He just received his sinemet dose.  He shows bilateral R>L tremulousness involving UE>LE.  He is briskly responsive to questions.  He is hypophonic and delayed in his speech production, but will nod or shake his head appropriately to questions. He can follow two step commands reliably.  Patient is currently on ABx therapy.  Patient knows that he is on depakote and eliquis, but is unable to state why.  Patient notes that he is not at home. He notes that he has not been confused since being in the hospital  TSH is notably high.  Patient has no questions at this time.    Hospital Course:  Vic Glez is a 84 y.o. male past medical history of Parkinson disease, bilateral pulmonary embolisms/left lower extremity DVT on Eliquis, hypertension, hyperlipidemia, who initially presented to [place] with altered mental status on 903 2024. Initially was believed to be secondary to urinary tract infection until the morning of 09/06/2024 when nursing staff noted he was significantly diaphoretic and unresponsive. He at that time he was febrile with an elevated lactic but no leukocytosis. He was started on broad antibiotics and given fluid resuscitation. Lactic acidosis improved and patient had improved back to baseline within an hour. However over the course of 2 days he had intermittent episodes of what was reported as possible seizure activity but it seemed he could also shake his head yes and no while this  was going on. Apparently he has had similar episodes at his nursing home prior to this admission. Due to the need for neurology evaluation request was made to transfer to our facility. Patient was accepted and transferred in stable condition. Upon arrival here patient is hemodynamically stable and in his normal state of health. Lab workup is unremarkable including a normal lactate. Troponin mildly elevated but down trended on repeat. Patient had 2 head CTs (most recent 09/06/2024) done at [place] and neither showed any acute abnormalities but did show multiple chronic white matter changes. Upon arrival to our facility patient is alert and oriented. Denies recollecting the seizure episodes. Denies having any headache. No history of seizures. Patient denies any dysuria currently.        Past Medical History:       Hypertension       Hyperlipidemia  Parkinsons, h/o BL PE, dementia    Medications:    Anticoagulant use:  Yes Eliquis  No Antiplatelet use  Reviewed EMR for current medications    Allergies:   NKDA    Social History:  Smoking: No  Alcohol Use: No    Family History:    There is no family history of premature cerebrovascular disease pertinent to this consultation    ROS :  14 Points Review of Systems was performed and was negative except mentioned in HPI.    Past Surgical History:  There Is No Surgical History Contributory To Today’s Visit    Examination  BP(135/90), Pulse(80), Temp(98),    Neuro Exam:  General: Alert,Awake, Oriented, Place, Person  Will follow two step commands reliably. Notable bradykinesia in movement and responses.    Speech: Dysarthric:  Very hypophonic and slowed. Dysarthric. No aphasia noted.    Language: Intact:    Face: Symmetric:    Facial Sensation: Intact:    Visual Fields: Intact:    Extraocular Movements: Intact:    Motor Exam: No Drift:  Generalized deconditioning throughout. Notably tremulous R>L.    Sensation: Intact:    Coordination: Intact:  Not out of proportion to  weakness and tremulousness.          This consult was conducted in real time using interactive audio and video technology. Patient was informed of the technology being used for this visit and agreed to proceed. Patient located in hospital and provider located at home/office setting.    Telehealth Neurology consultation was provided. I spent minutes providing telehealth care. This includes time spent for face to face visit via telemedicine, review of medical records, imaging studies and discussion of findings with providers, the patient and/or family.      Dr Watson Garcia      [facility]  For Inpatient follow-up with [facility] physician please call [facility] [phone]. This is not an outpatient service. Post hospital discharge, please contact hospital directly.    Please do not communicate with [facility] physicians via secure chat. If you have any questions, Please contact [facility].  Please call or reconsult our service if there are any clinical or diagnostic changes.

## 2024-09-09 NOTE — H&P
[facility]  Date: [facility]24   Patient Name: Vic Glez  : 1940  MRN: [id number]  Primary Care Physician:  Jennifer Grayson MD  Date of admission: 2024    Subjective   Subjective     Chief Complaint: Altered mental status    HPI:    Vic Glez is a 84 y.o. male past medical history of Parkinson disease, bilateral pulmonary embolisms/left lower extremity DVT on Eliquis, hypertension, hyperlipidemia, who initially presented to [facility] with altered mental status on 2024.  Initially was believed to be secondary to urinary tract infection until the morning of 2024 when nursing staff noted he was significantly diaphoretic and unresponsive.  He at that time he was febrile with an elevated lactic but no leukocytosis.  He was started on broad antibiotics and given fluid resuscitation.  Lactic acidosis improved and patient had improved back to baseline within an hour.  However over the course of 2 days he had intermittent episodes of what was reported as possible seizure activity but it seemed he could also shake his head yes and no while this was going on.  Apparently he has had similar episodes at his nursing home prior to this admission.  Due to the need for neurology evaluation request was made to transfer to our facility.  Patient was accepted and transferred in stable condition.  Upon arrival here patient is hemodynamically stable and in his normal state of health.  Lab workup is unremarkable including a normal lactate.  Troponin mildly elevated but down trended on repeat.  Patient had 2 head CTs (most recent 2024) done at [facility] and neither showed any acute abnormalities but did show multiple chronic white matter changes.  Upon arrival to our facility patient is alert and oriented.  Denies recollecting the seizure episodes.  Denies having any headache.  No history of seizures.  Patient denies any dysuria currently.      Personal History      Past Medical History:  Past Medical History:   Diagnosis Date    Arthritis     CKD (chronic kidney disease)     Dementia     Diabetes mellitus     DVT (deep vein thrombosis) in pregnancy     Hypertension     Parkinson's disease     Sleep apnea          Past Surgical History:  History reviewed. No pertinent surgical history.      Family History:   Reviewed and noncontributory except as mentioned in HPI    Social History:   Social Determinants of Health     Tobacco Use: Low Risk  (9/8/2024)    Patient History     Smoking Tobacco Use: Never     Smokeless Tobacco Use: Never     Passive Exposure: Never   Alcohol Use: Not At Risk (9/8/2024)    AUDIT-C     Frequency of Alcohol Consumption: Never     Average Number of Drinks: Patient does not drink     Frequency of Binge Drinking: Never   Financial Resource Strain: Low Risk  (9/3/2024)    Received from [facility]    Overall Financial Resource Strain (CARDIA)     Difficulty of Paying Living Expenses: Not hard at all   Food Insecurity: Patient Unable To Answer (9/3/2024)    Received from [facility]    Hunger Vital Sign     Worried About Running Out of Food in the Last Year: Patient unable to answer     Ran Out of Food in the Last Year: Patient unable to answer   Transportation Needs: No Transportation Needs (9/3/2024)    Received from [facility]    PRAPARE - Transportation     Lack of Transportation (Medical): No     Lack of Transportation (Non-Medical): No   Physical Activity: Inactive (9/3/2024)    Received from [facility]    Exercise Vital Sign     Days of Exercise per Week: 0 days     Minutes of Exercise per Session: 0 min   Stress: Patient Unable To Answer (9/3/2024)    Received from [facility]    [employer] - Occupational Stress Questionnaire     Feeling of Stress : Patient unable to answer   Social Connections: Unknown (8/19/2024)    Family and Community Support     Help with Day-to-Day Activities: Not on  file     Lonely or Isolated: Not on file   Interpersonal Safety: Not At Risk (9/8/2024)    Abuse Screen     Unsafe at Home or Work/School: no     Feels Threatened by Someone?: no     Does Anyone Keep You from Contacting Others or Doint Things Outside the Home?: no     Physical Sign of Abuse Present: no   Depression: Not on file   Housing Stability: Not At Risk (9/8/2024)    Housing Stability     Current Living Arrangements: assisted living facility     Potentially Unsafe Housing Conditions: none   Recent Concern: Housing Stability - Unknown (9/5/2024)    Received from [facility] Housing Stability Vital Sign     What is your living situation today?: patient declined     Think about the place you live. Do you have problems with any of the following?: N/A   Utilities: Unknown (9/3/2024)    Received from [facility] Utilities     Threatened with loss of utilities: Not on file   Health Literacy: Unknown (8/20/2024)    Education     Help with school or training?: Not on file     Preferred Language: English   Employment: Unknown (8/19/2024)    Employment     Do you want help finding or keeping work or a job?: Not on file   Disabilities: At Risk (9/8/2024)    Disabilities     Concentrating, Remembering, or Making Decisions Difficulty: no     Doing Errands Independently Difficulty: yes         Home Medications:  Divalproex Sodium, acetaminophen, apixaban, atorvastatin, azithromycin, carbidopa-levodopa, folic acid, levothyroxine, lisinopril, loratadine, propranolol, and vitamin B-12    Allergies:  No Known Allergies    Review of Systems   All systems were reviewed and negative    Objective   Objective     Vitals:   Temp:  [97.9 °F (36.6 °C)] 97.9 °F (36.6 °C)  Heart Rate:  [80] 80  BP: (144)/(95) 144/95    Physical Exam    Constitutional: Awake, alert, no acute distress   Eyes: Pupils equal, sclerae anicteric, no conjunctival injection   HENT: NCAT, mucous membranes moist   Neck: Supple, no  thyromegaly, no lymphadenopathy, trachea midline   Respiratory: Clear to auscultation bilaterally, nonlabored respirations    Cardiovascular: RRR, no murmurs, rubs, or gallops, palpable pedal pulses bilaterally   Gastrointestinal: Positive bowel sounds, soft, nontender, nondistended   Musculoskeletal: No bilateral ankle edema, no clubbing or cyanosis to extremities   Psychiatric: Appropriate affect, cooperative   Neurologic: Oriented x 3, strength symmetric in all extremities, Cranial Nerves grossly intact to confrontation, speech clear.  Pill-rolling tremor noted in left upper extremity   Skin: No rashes     Result Review    Result Review:  I have personally reviewed the results from the time of this admission to 9/8/2024 23:12 EDT and agree with these findings:  [x]  Laboratory  [x]  Microbiology  [x]  Radiology  [x]  EKG/Telemetry   [x]  Cardiology/Vascular   []  Pathology  [x]  Old records  []  Other:      Assessment & Plan   Assessment / Plan     Assessment/Plan:   Acute metabolic encephalopathy  Possible seizure activity  Complicated urinary tract infection  Sepsis with unclear source (fever, tachypnea, lactic acidosis at offsite facility on 09/06/2024)  Elevated troponin likely secondary to demand ischemia type II MI due to above processes  History of bilateral PE/left lower extremity DVT  Parkinson disease  Hypertension  Hyperlipidemia  Hypothyroidism      Plan  - Remains admitted to the hospitalist service  - Encephalopathy seems of unclear etiology, initially was being treated for UTI at outside facility and then had episode of fever and tachypnea at offsite facility with possible shaking-like episode.  Transferred for neurology evaluation.  We will continue broad antibiotics for now.  MRSA nares to help de-escalate.  Follow-up blood cultures from outside facility.  Recheck UA  - Seizure precautions and as needed Ativan ordered for any seizure activity.  EEG ordered.  Patient was not started on any seizure  medicine at offsite facility, will monitor for seizure activity and if recurs we can start at that point.  Neurology consulted.  We will check a TSH and B12 level.  Will also check ammonia given patient takes Depakote at home and this can cause hyperammonemia  - Troponin is already downtrending and patient without chest pain, no concern for ischemia at this time  - Continue home Eliquis for PE/DVT  - Continue home Sinemet  - Continue home antihypertensives  - Continue home Synthroid          Discussed with ER Physician and Nurse    All labs/imaging studies were personally reviewed and findings are as noted above      DVT Prophylaxis: Eliquis    CODE STATUS:    Code Status (Patient has no pulse and is not breathing): CPR (Attempt to Resuscitate)  Medical Interventions (Patient has pulse or is breathing): Full Support      Admission Status:  I believe this patient meets inpatient status.    Electronically signed by Jamaal Daugherty MD, 09/08/24, 11:12 PM EDT.

## 2024-09-09 NOTE — PLAN OF CARE
Goal Outcome Evaluation:              Outcome Evaluation: Pt. alert and oriented x4 on 1L of oxygen due to sats dropping into upper 80s. Pt. has not exhibited signs of seizure-like activity during shift. Continue care per POC.

## 2024-09-09 NOTE — SIGNIFICANT NOTE
Wound Eval / Progress Noted    KARY Garner     Patient Name: Vic Glez  : 1940  MRN: [id number]  Today's Date: 2024                 Admit Date: 2024    Visit Dx:  No diagnosis found.      Seizure        Past Medical History:   Diagnosis Date    Arthritis     CKD (chronic kidney disease)     Dementia     Diabetes mellitus     DVT (deep vein thrombosis) in pregnancy     Hypertension     Parkinson's disease     Sleep apnea         History reviewed. No pertinent surgical history.      Physical Assessment:  Wound 24 1317 gluteal MASD (Moisture associated skin damage) (Active)   Dressing Appearance intact;dry 24 1250   Closure None 24 1250   Base moist;white;pink 24 1250   Periwound intact;dry;macerated;pale white 24 1250   Periwound Temperature warm 24 1250   Periwound Skin Turgor soft 24 1250   Edges rolled/closed 24 1250   Drainage Amount none 24 1250   Care, Wound cleansed with;sterile normal saline 24 1250   Dressing Care open to air;skin barrier agent applied 24 1250   Periwound Care barrier ointment applied 24 1250          Wound Check / Follow-up: Patient seen today for a wound consult.  Patient is awake and alert at the time of assessment.  Primary RN present at bedside.    Patient with pale white macerated tissue to the sacrum with no visible open wound base present.  No pressure indicated at the time of assessment.  Periwound is pink soft and intact.  Cleansed with normal saline.  Recommending application of orange top and blue top moisture barrier 3 times daily and as needed and leave open to air.  Implement every 2 hours turns and offload at all times.  Keep the patient clean and free from all moisture.    No other wounds indicated during skin assessment.    Impression: Pale white maceration to sacrum     Short term goals:  regain skin integrity, skin protection, moisture prevention, pressure reduction, topical  treatment, quality skin care and hygiene.    Fiordaliza Rush RN    9/9/2024    13:17 EDT

## 2024-09-09 NOTE — PROGRESS NOTES
[facility] Clinical Pharmacy Services: Vancomycin Pharmacokinetic Initial Consult Note    Vic Glez is a 84 y.o. male who is on day 1 of pharmacy to dose vancomycin for Bacteremia and Urinary Tract Infection at [facility].  Patient was transferred from [facility] where he was receiving Vancomycin therapy.      Consult Information  Consulting Provider: Dr. Jamaal Daugherty  Planned Duration of Therapy: 7 days  Was Patient Receiving Prior to Admission/Consult?: Yes, last dose received  at 1031.    PK/PD Target: -600 mg/L.hr   Other Antimicrobials: Piperacillin/Tazobactam    Microbiology Data  MRSA PCR performed: 24; Result: Negative  Culture/Source:    Blood Cx: In process    Vitals/Labs  Ht:  ; Wt: 83.4 kg (183 lb 13.8 oz)  Temp (24hrs), Av °F (36.7 °C), Min:97.9 °F (36.6 °C), Max:98.1 °F (36.7 °C)   Estimated Creatinine Clearance: 72.2 mL/min (by C-G formula based on SCr of 0.93 mg/dL).       Results from last 7 days   Lab Units 24  0526 24  1956   CREATININE mg/dL 0.93 0.96   WBC 10*3/mm3 6.92 7.05     Assessment/Plan:    Vancomycin Dose:  750 mg IV every 12 hours; which provides the following predicted parameters:    Exposure target: AUC24 (range)400-600 mg/L.hr   AUC24,ss: 500 mg/L.hr  Probability of AUC24 > 400: 73 %  Ctrough,ss: 16.9 mg/L  Probability of Ctrough,ss > 20: 35 %  Probability of nephrotoxicity (Lodise CORA ): 13 %    Patient has order for Basic Metabolic Panel    Pharmacy will follow patient's kidney function and will adjust doses and obtain levels as necessary. Thank you for involving pharmacy in this patient's care. Please contact pharmacy with any questions or concerns.                           Ej Easley  Clinical Pharmacist

## 2024-09-09 NOTE — PROGRESS NOTES
[facility]   Hospitalist Progress Note  Date: 2024  Patient Name: Vic Glez  : 1940  MRN: [id number]  Date of admission: 2024    Subjective   Subjective     Chief Complaint:   Altered mental status    Summary:   Vic Glez is a 84 y.o. male past medical history of Parkinson disease, bilateral pulmonary embolisms/left lower extremity DVT on Eliquis, hypertension, hyperlipidemia, who initially presented to [place] with altered mental status on 2024.  Initially was believed to be secondary to urinary tract infection until the morning of 2024 when nursing staff noted he was significantly diaphoretic and unresponsive.  He at that time he was febrile with an elevated lactic but no leukocytosis.  He was started on broad antibiotics and given fluid resuscitation.  Lactic acidosis improved and patient had improved back to baseline within an hour.  However over the course of 2 days he had intermittent episodes of what was reported as possible seizure activity but it seemed he could also shake his head yes and no while this was going on.  Apparently he has had similar episodes at his nursing home prior to this admission.  Due to the need for neurology evaluation request was made to transfer to our facility.  Patient was accepted and transferred in stable condition.  Upon arrival here patient is hemodynamically stable and in his normal state of health.  Lab workup is unremarkable including a normal lactate.  Troponin mildly elevated but down trended on repeat.  Patient had 2 head CTs (most recent 2024) done at [place] and neither showed any acute abnormalities but did show multiple chronic white matter changes.  Upon arrival to our facility patient is alert and oriented.  Denies recollecting the seizure episodes.  Denies having any headache.  No history of seizures.  Patient denies any dysuria currently.     Interval Followup:   No acute events overnight, patient sitting up in  bed, slow to respond, resting tremor noted    Objective   Objective     Vitals:   Temp:  [97.9 °F (36.6 °C)-98.1 °F (36.7 °C)] 98.1 °F (36.7 °C)  Heart Rate:  [74-80] 74  Resp:  [16-20] 16  BP: (125-158)/(65-98) 158/85    Physical Exam   GEN: No acute distress  HEENT: Moist mucous membranes  LUNGS: Equal chest rise bilaterally  CARDIAC: Regular rate and rhythm  NEURO: Moving all 4 extremities spontaneously  SKIN: No obvious breakdown    Result Review    Result Review:  I have personally reviewed the results as below  [x]  Laboratory CBC, CMP personally reviewed  CBC          8/21/2024    05:23 9/8/2024    19:56 9/9/2024    05:26   CBC   WBC 6.29  7.05  6.92    RBC 4.02  3.59  3.78    Hemoglobin 12.1  10.8  11.5    Hematocrit 36.6  33.8  35.0    MCV 91.0  94.2  92.6    MCH 30.1  30.1  30.4    MCHC 33.1  32.0  32.9    RDW 12.3  14.4  14.1    Platelets 234  189  203      CMP          8/21/2024    05:23 9/8/2024    19:56 9/9/2024    05:26   CMP   Glucose 96  99  100    BUN 17  17  19    Creatinine 0.86  0.96  0.93    EGFR 86.5  78.9  82.0    Sodium 138  138  139    Potassium 3.8  4.4  4.6    Chloride 104  105  108    Calcium 8.5  8.9  9.0    Total Protein 5.6  6.3     Albumin 2.6  3.1     Globulin  3.2     Total Bilirubin 0.5  0.3     Alkaline Phosphatase 47  45     AST (SGOT) 33  12     ALT (SGPT) 22  <5     Albumin/Globulin Ratio  1.0     BUN/Creatinine Ratio 19.8  17.7  20.4    Anion Gap 6.3  7.8  8.5      []  Microbiology  []  Radiology  []  EKG/Telemetry   []  Cardiology/Vascular   []  Pathology  []  Old records  []  Other:    Scheduled medications:  apixaban, 5 mg, Oral, BID  atorvastatin, 20 mg, Oral, Daily  carbidopa-levodopa, 1 tablet, Oral, 4x Daily  cetirizine, 10 mg, Oral, Daily  Divalproex Sodium, 250 mg, Oral, BID  folic acid, 1 mg, Oral, Daily  levothyroxine, 25 mcg, Oral, Q AM  lisinopril, 5 mg, Oral, Daily  piperacillin-tazobactam, 3.375 g, Intravenous, Q8H  propranolol, 60 mg, Oral, BID  sodium  chloride, 10 mL, Intravenous, Q12H  vancomycin, 750 mg, Intravenous, Q12H  vitamin B-12, 1,000 mcg, Oral, Daily      As needed medications:    aluminum-magnesium hydroxide-simethicone    senna-docusate sodium **AND** polyethylene glycol **AND** bisacodyl **AND** bisacodyl    LORazepam    nitroglycerin    Pharmacy to dose vancomycin    sodium chloride    sodium chloride  Infusions:  Pharmacy to dose vancomycin,   sodium chloride, 75 mL/hr, Last Rate: 75 mL/hr (09/09/24 0009)           Assessment & Plan   Assessment / Plan     Assessment:  Acute metabolic encephalopathy  Possible seizure activity  Complicated urinary tract infection  Sepsis with unclear source (fever, tachypnea, lactic acidosis at offsite facility on 09/06/2024)  Elevated troponin likely secondary to demand ischemia type II MI due to above processes  History of bilateral PE/left lower extremity DVT  Parkinson disease  Hypertension  Hyperlipidemia  Hypothyroidism    Plan:  Patient admitted to the hospital for further workup and management of above  Encephalopathy with unclear etiology, initially was being treated by UTI for outside facilities and episode of fever, tachypnea with possible shaking-like episode  Patient evaluated by teleneurology, appreciate their recommendations  Continue antibiotics for now, follow-up on culture result  UA ordered and pending  Continue seizure precautions, as needed Ativan  EEG ordered and pending  TSH elevated, will need repeat when acute issues resolve, home Synthroid reviewed  Ammonia level reviewed and normal  Procalcitonin negative  White blood cell count normal  Continue home Eliquis for PE/DVT  Continue Sinemet  Continue home antihypertensives  CBC, CMP reviewed  Repeat CBC, CMP, mag and Phos in a.m.     Reviewed patients labs and imaging, and discussed with patient and nurse at bedside.    VTE Prophylaxis:  Pharmacologic & mechanical VTE prophylaxis orders are present.        CODE STATUS:   Code Status (Patient  has no pulse and is not breathing): CPR (Attempt to Resuscitate)  Medical Interventions (Patient has pulse or is breathing): Full Support      Electronically signed by Mayito Camarena MD, 9/9/2024, 11:00 EDT.

## 2024-09-10 LAB
ALBUMIN SERPL-MCNC: 3.1 G/DL (ref 3.5–5.2)
ALBUMIN/GLOB SERPL: 1 G/DL
ALP SERPL-CCNC: 45 U/L (ref 39–117)
ALT SERPL W P-5'-P-CCNC: <5 U/L (ref 1–41)
ANION GAP SERPL CALCULATED.3IONS-SCNC: 9.3 MMOL/L (ref 5–15)
AST SERPL-CCNC: 12 U/L (ref 1–40)
BASOPHILS # BLD AUTO: 0.06 10*3/MM3 (ref 0–0.2)
BASOPHILS NFR BLD AUTO: 0.8 % (ref 0–1.5)
BILIRUB SERPL-MCNC: 0.4 MG/DL (ref 0–1.2)
BUN SERPL-MCNC: 20 MG/DL (ref 8–23)
BUN/CREAT SERPL: 18.9 (ref 7–25)
CALCIUM SPEC-SCNC: 9.2 MG/DL (ref 8.6–10.5)
CHLORIDE SERPL-SCNC: 105 MMOL/L (ref 98–107)
CO2 SERPL-SCNC: 23.7 MMOL/L (ref 22–29)
CREAT SERPL-MCNC: 1.06 MG/DL (ref 0.76–1.27)
DEPRECATED RDW RBC AUTO: 49.5 FL (ref 37–54)
EGFRCR SERPLBLD CKD-EPI 2021: 70.1 ML/MIN/1.73
EOSINOPHIL # BLD AUTO: 0.29 10*3/MM3 (ref 0–0.4)
EOSINOPHIL NFR BLD AUTO: 3.8 % (ref 0.3–6.2)
ERYTHROCYTE [DISTWIDTH] IN BLOOD BY AUTOMATED COUNT: 14.4 % (ref 12.3–15.4)
GLOBULIN UR ELPH-MCNC: 3.2 GM/DL
GLUCOSE SERPL-MCNC: 99 MG/DL (ref 65–99)
HCT VFR BLD AUTO: 35.4 % (ref 37.5–51)
HGB BLD-MCNC: 11.3 G/DL (ref 13–17.7)
IMM GRANULOCYTES # BLD AUTO: 0.1 10*3/MM3 (ref 0–0.05)
IMM GRANULOCYTES NFR BLD AUTO: 1.3 % (ref 0–0.5)
LYMPHOCYTES # BLD AUTO: 1.69 10*3/MM3 (ref 0.7–3.1)
LYMPHOCYTES NFR BLD AUTO: 22.2 % (ref 19.6–45.3)
MAGNESIUM SERPL-MCNC: 2 MG/DL (ref 1.6–2.4)
MCH RBC QN AUTO: 30.4 PG (ref 26.6–33)
MCHC RBC AUTO-ENTMCNC: 31.9 G/DL (ref 31.5–35.7)
MCV RBC AUTO: 95.2 FL (ref 79–97)
MONOCYTES # BLD AUTO: 0.71 10*3/MM3 (ref 0.1–0.9)
MONOCYTES NFR BLD AUTO: 9.3 % (ref 5–12)
NEUTROPHILS NFR BLD AUTO: 4.76 10*3/MM3 (ref 1.7–7)
NEUTROPHILS NFR BLD AUTO: 62.6 % (ref 42.7–76)
NRBC BLD AUTO-RTO: 0 /100 WBC (ref 0–0.2)
PHOSPHATE SERPL-MCNC: 3.3 MG/DL (ref 2.5–4.5)
PLATELET # BLD AUTO: 200 10*3/MM3 (ref 140–450)
PMV BLD AUTO: 10.3 FL (ref 6–12)
POTASSIUM SERPL-SCNC: 4.1 MMOL/L (ref 3.5–5.2)
PROT SERPL-MCNC: 6.3 G/DL (ref 6–8.5)
RBC # BLD AUTO: 3.72 10*6/MM3 (ref 4.14–5.8)
SODIUM SERPL-SCNC: 138 MMOL/L (ref 136–145)
WBC NRBC COR # BLD AUTO: 7.61 10*3/MM3 (ref 3.4–10.8)

## 2024-09-10 PROCEDURE — 80053 COMPREHEN METABOLIC PANEL: CPT | Performed by: INTERNAL MEDICINE

## 2024-09-10 PROCEDURE — 25010000002 VANCOMYCIN 5 G RECONSTITUTED SOLUTION: Performed by: STUDENT IN AN ORGANIZED HEALTH CARE EDUCATION/TRAINING PROGRAM

## 2024-09-10 PROCEDURE — 83735 ASSAY OF MAGNESIUM: CPT | Performed by: STUDENT IN AN ORGANIZED HEALTH CARE EDUCATION/TRAINING PROGRAM

## 2024-09-10 PROCEDURE — 84100 ASSAY OF PHOSPHORUS: CPT | Performed by: INTERNAL MEDICINE

## 2024-09-10 PROCEDURE — 25010000002 PIPERACILLIN SOD-TAZOBACTAM PER 1 G: Performed by: STUDENT IN AN ORGANIZED HEALTH CARE EDUCATION/TRAINING PROGRAM

## 2024-09-10 PROCEDURE — 25810000003 SODIUM CHLORIDE 0.9 % SOLUTION: Performed by: STUDENT IN AN ORGANIZED HEALTH CARE EDUCATION/TRAINING PROGRAM

## 2024-09-10 PROCEDURE — 99232 SBSQ HOSP IP/OBS MODERATE 35: CPT | Performed by: INTERNAL MEDICINE

## 2024-09-10 PROCEDURE — 99233 SBSQ HOSP IP/OBS HIGH 50: CPT | Performed by: FAMILY MEDICINE

## 2024-09-10 PROCEDURE — 85025 COMPLETE CBC W/AUTO DIFF WBC: CPT | Performed by: STUDENT IN AN ORGANIZED HEALTH CARE EDUCATION/TRAINING PROGRAM

## 2024-09-10 RX ADMIN — DIVALPROEX SODIUM 250 MG: 125 CAPSULE, COATED PELLETS ORAL at 20:54

## 2024-09-10 RX ADMIN — CARBIDOPA AND LEVODOPA 1 TABLET: 25; 250 TABLET ORAL at 17:11

## 2024-09-10 RX ADMIN — Medication 10 ML: at 08:50

## 2024-09-10 RX ADMIN — PIPERACILLIN AND TAZOBACTAM 3.38 G: 3; .375 INJECTION, POWDER, FOR SOLUTION INTRAVENOUS at 15:03

## 2024-09-10 RX ADMIN — APIXABAN 5 MG: 5 TABLET, FILM COATED ORAL at 08:50

## 2024-09-10 RX ADMIN — APIXABAN 5 MG: 5 TABLET, FILM COATED ORAL at 20:55

## 2024-09-10 RX ADMIN — LISINOPRIL 5 MG: 5 TABLET ORAL at 08:50

## 2024-09-10 RX ADMIN — CETIRIZINE HYDROCHLORIDE 10 MG: 10 TABLET, FILM COATED ORAL at 08:50

## 2024-09-10 RX ADMIN — PROPRANOLOL HYDROCHLORIDE 60 MG: 40 TABLET ORAL at 08:50

## 2024-09-10 RX ADMIN — ATORVASTATIN CALCIUM 20 MG: 20 TABLET, FILM COATED ORAL at 08:50

## 2024-09-10 RX ADMIN — CARBIDOPA AND LEVODOPA 1 TABLET: 25; 250 TABLET ORAL at 08:50

## 2024-09-10 RX ADMIN — LEVOTHYROXINE SODIUM 25 MCG: 0.03 TABLET ORAL at 06:00

## 2024-09-10 RX ADMIN — PIPERACILLIN AND TAZOBACTAM 3.38 G: 3; .375 INJECTION, POWDER, FOR SOLUTION INTRAVENOUS at 06:00

## 2024-09-10 RX ADMIN — Medication 10 ML: at 20:56

## 2024-09-10 RX ADMIN — DIVALPROEX SODIUM 250 MG: 125 CAPSULE, COATED PELLETS ORAL at 08:50

## 2024-09-10 RX ADMIN — CYANOCOBALAMIN TAB 500 MCG 1000 MCG: 500 TAB at 08:50

## 2024-09-10 RX ADMIN — FOLIC ACID 1 MG: 1 TABLET ORAL at 08:50

## 2024-09-10 RX ADMIN — CARBIDOPA AND LEVODOPA 1 TABLET: 25; 250 TABLET ORAL at 15:03

## 2024-09-10 RX ADMIN — PIPERACILLIN AND TAZOBACTAM 3.38 G: 3; .375 INJECTION, POWDER, FOR SOLUTION INTRAVENOUS at 22:42

## 2024-09-10 RX ADMIN — CARBIDOPA AND LEVODOPA 1 TABLET: 25; 250 TABLET ORAL at 20:54

## 2024-09-10 RX ADMIN — PROPRANOLOL HYDROCHLORIDE 60 MG: 40 TABLET ORAL at 20:54

## 2024-09-10 RX ADMIN — VANCOMYCIN HYDROCHLORIDE 750 MG: 5 INJECTION, POWDER, LYOPHILIZED, FOR SOLUTION INTRAVENOUS at 00:54

## 2024-09-10 NOTE — PLAN OF CARE
Goal Outcome Evaluation:           Progress: no change  Outcome Evaluation: pt alert and able to make needs known. intermittently confused. no events during shift. continue with plan of care.

## 2024-09-10 NOTE — CONSULTS
[facility]  [facility] TeleNeurology Consult Services    Routine Consult Follow-Up    Patient Name:   Vic Glez  YOB: 1940  Identification Number:   MRN - [id number]  Date of Service:   09/10/2024 10:14:10    Diagnosis        G93.41 - Encephalopathy Metabolic        F05.0 - Delirium not superimposed on dementia, so described    Impression  Idiopathic Parkinson's disease.    Patient bradykinetic, tremulous on examination this morning. Follows two step commands reliably. More awake this morning.    Patient showing signs of delirium in the setting of metabolic encephalopathy likely d/t infectious state, antibiotics, Notable elevations to TSH. Less likely seizure activity but will evaluate and follow.  Encephalopathy improving.    MRI, EEG WNL    Recommendations:  - Further infectious/metabolic w/u per primary team.  - Delirium precautions.  - Continue eliquis.  - Continue home sinemet  -- Will need in person examination by neurologist to fully evaluate parkinsonian features for medication adjustment PRN. Recommend outpatient f/u after acute phase.  - If continuing to evaluate for infection, would consider ID consultation.  -- If patient shows decline, or has unknown source of fevers, would consider LP w/ basic and infectious studies. Afebrile with no growth on BCx at this time.  - Will reevaluate mental status on alternative schedule.    Our recommendations are outlined below    Nursing Recommendations :  Delirium precautions: Blinds open during the day, closed at night, frequent reorientation, minimize nighttime interruptionsWhen possible avoid benzodiazepines, opioid pain medications, and anticholinergic medications    Consultations :  Toxic metabolic work up per primary teamID consultation for assistance with management if available    DVT Prophylaxis :  Choice of Primary Team    Disposition :  Outpatient Neurology follow up in 1-3 weeks    Subjective  Doing well. Briskly responsive.  Follows commands. No new complaints.  Appears more awake today. Reports that he feels like he is at his baseline. Follow two step commands.    Imaging  MRI Brain 9/4  There is moderate cerebral atrophy with widening of the extra-axial spaces and ventricular dilatation.  There are multiple white matter hyperintensities, distributed throughout the deep white matter tracts of the cerebral hemispheres, consistent with moderate chronic white matter ischemic changes.  There is no evidence for recent intracranial ischemia or other cause of cytotoxic edema on diffusion weighted imaging (DWI).  Normal T2* images of the brain without demonstrated susceptibility artifact.    EEG shows no seizure activity    Labs  TSH 8.5  Ammonia WNL      Examination  BP(110/60), Pulse(77), Temp(97.5),    Neuro Exam:  General: Alert,Awake, Oriented, Place, Person  Will follow two step commands reliably. Notable bradykinesia in movement and responses.    Speech: Dysarthric:  Very hypophonic and slowed. Dysarthric. No aphasia noted.    Language: Intact:    Face: Symmetric:    Facial Sensation: Intact:    Visual Fields: Intact:    Extraocular Movements: Intact:    Motor Exam: No Drift:  Generalized deconditioning throughout. Notably tremulous R>L.    Sensation: Intact:    Coordination: Intact:  Not out of proportion to weakness and tremulousness.          This consult was conducted in real time using interactive audio and video technology. Patient was informed of the technology being used for this visit and agreed to proceed. Patient located in hospital and provider located at home/office setting.    Telehealth Neurology consultation was provided. I spent minutes providing telehealth care. This includes time spent for face to face visit via telemedicine, review of medical records, imaging studies and discussion of findings with providers, the patient and/or family.      Dr Watson Garcia      [facility]  For Inpatient follow-up with  [facility] physician please call [facility] [phone]. This is not an outpatient service. Post hospital discharge, please contact hospital directly.    Please do not communicate with [facility] physicians via secure chat. If you have any questions, Please contact [facility].  Please call or reconsult our service if there are any clinical or diagnostic changes.

## 2024-09-10 NOTE — PROGRESS NOTES
[facility] Clinical Pharmacy Services: Vancomycin Pharmacokinetic Consult Note    Vic Glez is a 84 y.o. male who is on day 2 of pharmacy to dose vancomycin for Bacteremia and Urinary Tract Infection at [facility].  Patient was transferred from [facility] on  where he was previously receiving Vancomycin therapy.      Consulting Provider: Dr. Jamaal Daugherty    PK/PD Target: -600 mg/L.hr   Other Antimicrobials: Piperacillin/Tazobactam    Microbiology Data  MRSA PCR performed: 2024; Result: Negative  Culture/Source:    Blood Cx: No Growth @ 24 hours    Vitals/Labs  Ht:  ; Wt: 83.4 kg (183 lb 13.8 oz)  Temp (24hrs), Av.9 °F (36.6 °C), Min:97.7 °F (36.5 °C), Max:98.1 °F (36.7 °C)   Estimated Creatinine Clearance: 63.4 mL/min (by C-G formula based on SCr of 1.06 mg/dL).       Results from last 7 days   Lab Units 09/10/24  0513 24  0526 24  1956   CREATININE mg/dL 1.06 0.93 0.96   WBC 10*3/mm3 7.61 6.92 7.05     Assessment/Plan:    Vancomycin Dose:  750 mg IV every 12 hours; which provides the following predicted parameters:    Exposure target: AUC24 (range)400-600 mg/L.hr   AUC24,ss: 553 mg/L.hr  Probability of AUC24 > 400: 81 %  Ctrough,ss: 19.1 mg/L  Probability of Ctrough,ss > 20: 46 %  Probability of nephrotoxicity (Lodise CORA ): 16 %    Vancomycin random level with a.m. labs   Patient has order for Basic Metabolic Panel    Pharmacy will follow patient's kidney function and will adjust doses and obtain levels as necessary. Thank you for involving pharmacy in this patient's care. Please contact pharmacy with any questions or concerns.                           Ej Easley  Clinical Pharmacist

## 2024-09-10 NOTE — PROGRESS NOTES
[facility]   Hospitalist Progress Note  Date: 9/10/2024  Patient Name: Vic Glez  : 1940  MRN: [id number]  Date of admission: 2024    Subjective   Subjective     Chief Complaint:   Altered mental status    Summary:   Vic Glez is a 84 y.o. male past medical history of Parkinson disease, bilateral pulmonary embolisms/left lower extremity DVT on Eliquis, hypertension, hyperlipidemia, who initially presented to [facility] with altered mental status on 2024.  Initially was believed to be secondary to urinary tract infection until the morning of 2024 when nursing staff noted he was significantly diaphoretic and unresponsive.  He at that time he was febrile with an elevated lactic but no leukocytosis.  He was started on broad antibiotics and given fluid resuscitation.  Lactic acidosis improved and patient had improved back to baseline within an hour.  However over the course of 2 days he had intermittent episodes of what was reported as possible seizure activity but it seemed he could also shake his head yes and no while this was going on.  Apparently he has had similar episodes at his nursing home prior to this admission.  Due to the need for neurology evaluation request was made to transfer to our facility.  Patient was accepted and transferred in stable condition.  Upon arrival here patient is hemodynamically stable and in his normal state of health.  Lab workup is unremarkable including a normal lactate.  Troponin mildly elevated but down trended on repeat.  Patient had 2 head CTs (most recent 2024) done at [facility] and neither showed any acute abnormalities but did show multiple chronic white matter changes.  Upon arrival to our facility patient is alert and oriented.  Denies recollecting the seizure episodes.  Denies having any headache.  No history of seizures.  Patient denies any dysuria currently.     Interval Followup:   No acute events overnight, patient resting  comfortably in bed.  Patient evaluated by teleneurology who think seizures are less likely, patient showing signs of delirium with metabolic encephalopathy as well as underlying Parkinson's disease    Objective   Objective     Vitals:   Temp:  [97.5 °F (36.4 °C)-98.1 °F (36.7 °C)] 97.5 °F (36.4 °C)  Heart Rate:  [59-77] 77  Resp:  [16] 16  BP: ()/(53-80) 110/60    Physical Exam   GEN: No acute distress  HEENT: Moist mucous membranes  LUNGS: Equal chest rise bilaterally  CARDIAC: Regular rate and rhythm  NEURO: Moving all 4 extremities spontaneously  SKIN: No obvious breakdown    Result Review    Result Review:  I have personally reviewed the results as below  [x]  Laboratory CBC, CMP personally reviewed  CBC          9/8/2024    19:56 9/9/2024    05:26 9/10/2024    05:13   CBC   WBC 7.05  6.92  7.61    RBC 3.59  3.78  3.72    Hemoglobin 10.8  11.5  11.3    Hematocrit 33.8  35.0  35.4    MCV 94.2  92.6  95.2    MCH 30.1  30.4  30.4    MCHC 32.0  32.9  31.9    RDW 14.4  14.1  14.4    Platelets 189  203  200      CMP          9/8/2024    19:56 9/9/2024    05:26 9/10/2024    05:13   CMP   Glucose 99  100  99    BUN 17  19  20    Creatinine 0.96  0.93  1.06    EGFR 78.9  82.0  70.1    Sodium 138  139  138    Potassium 4.4  4.6  4.1    Chloride 105  108  105    Calcium 8.9  9.0  9.2    Total Protein 6.3   6.3    Albumin 3.1   3.1    Globulin 3.2   3.2    Total Bilirubin 0.3   0.4    Alkaline Phosphatase 45   45    AST (SGOT) 12   12    ALT (SGPT) <5   <5    Albumin/Globulin Ratio 1.0   1.0    BUN/Creatinine Ratio 17.7  20.4  18.9    Anion Gap 7.8  8.5  9.3      []  Microbiology  []  Radiology  []  EKG/Telemetry   []  Cardiology/Vascular   []  Pathology  []  Old records  []  Other:    Scheduled medications:  apixaban, 5 mg, Oral, BID  atorvastatin, 20 mg, Oral, Daily  carbidopa-levodopa, 1 tablet, Oral, 4x Daily  cetirizine, 10 mg, Oral, Daily  Divalproex Sodium, 250 mg, Oral, BID  folic acid, 1 mg, Oral,  Daily  levothyroxine, 25 mcg, Oral, Q AM  lisinopril, 5 mg, Oral, Daily  piperacillin-tazobactam, 3.375 g, Intravenous, Q8H  propranolol, 60 mg, Oral, BID  sodium chloride, 10 mL, Intravenous, Q12H  vancomycin, 750 mg, Intravenous, Q12H  vitamin B-12, 1,000 mcg, Oral, Daily      As needed medications:    aluminum-magnesium hydroxide-simethicone    senna-docusate sodium **AND** polyethylene glycol **AND** bisacodyl **AND** bisacodyl    LORazepam    nitroglycerin    Pharmacy to dose vancomycin    sodium chloride    sodium chloride  Infusions:  Pharmacy to dose vancomycin,            Assessment & Plan   Assessment / Plan     Assessment:  Acute metabolic encephalopathy  Possible seizure activity  Complicated urinary tract infection  Sepsis with unclear source (fever, tachypnea, lactic acidosis at offsite facility on 09/06/2024)  Elevated troponin likely secondary to demand ischemia type II MI due to above processes  History of bilateral PE/left lower extremity DVT  Parkinson disease  Hypertension  Hyperlipidemia  Hypothyroidism    Plan:  Patient admitted to the hospital for further workup and management of above  Encephalopathy with unclear etiology, initially was being treated by UTI for outside facilities and episode of fever, tachypnea with possible shaking-like episode, continuing on Zosyn currently  Patient evaluated by teleneurology, appreciate their recommendations  Blood culture no growth at 24 hours, influenza negative, COVID-19 negative  Continue seizure precautions,  EEG reviewed, no elliptic form discharges noted  TSH elevated, will need repeat when acute issues resolve, continue home Synthroid  Ammonia level reviewed and normal  Procalcitonin negative  White blood cell count normal  Continue home Eliquis for PE/DVT  Continue Sinemet  Continue home antihypertensives  CBC, CMP reviewed 9/10/2024   Repeat CBC, CMP, mag and Phos in a.m. 9/10/2024      Reviewed patients labs and imaging, and discussed with  patient and nurse at bedside.    VTE Prophylaxis:  Pharmacologic & mechanical VTE prophylaxis orders are present.    Disposition: Back to long-term care facility once acute issues resolved    CODE STATUS:   Code Status (Patient has no pulse and is not breathing): CPR (Attempt to Resuscitate)  Medical Interventions (Patient has pulse or is breathing): Full Support      Electronically signed by Mayito Camarena MD, 9/10/2024, 10:08 EDT.

## 2024-09-10 NOTE — PLAN OF CARE
Goal Outcome Evaluation:  Plan of Care Reviewed With: patient        Progress: improving  Outcome Evaluation: patient has been alert and oriented for me throughout shift, has been able to feed self today, wound on buttocks appears open now. New photo taken, put in chart, and wound care consulted. patient appears to be improving. patient must have pills whole in applesauce, seizure precautions applied.

## 2024-09-11 LAB
ALBUMIN SERPL-MCNC: 3.2 G/DL (ref 3.5–5.2)
ALBUMIN/GLOB SERPL: 0.9 G/DL
ALP SERPL-CCNC: 45 U/L (ref 39–117)
ALT SERPL W P-5'-P-CCNC: <5 U/L (ref 1–41)
ANION GAP SERPL CALCULATED.3IONS-SCNC: 8.2 MMOL/L (ref 5–15)
AST SERPL-CCNC: 12 U/L (ref 1–40)
BASOPHILS # BLD AUTO: 0.08 10*3/MM3 (ref 0–0.2)
BASOPHILS NFR BLD AUTO: 1 % (ref 0–1.5)
BILIRUB SERPL-MCNC: 0.3 MG/DL (ref 0–1.2)
BUN SERPL-MCNC: 23 MG/DL (ref 8–23)
BUN/CREAT SERPL: 21.1 (ref 7–25)
CALCIUM SPEC-SCNC: 9 MG/DL (ref 8.6–10.5)
CHLORIDE SERPL-SCNC: 103 MMOL/L (ref 98–107)
CO2 SERPL-SCNC: 25.8 MMOL/L (ref 22–29)
CREAT SERPL-MCNC: 1.09 MG/DL (ref 0.76–1.27)
DEPRECATED RDW RBC AUTO: 48.7 FL (ref 37–54)
EGFRCR SERPLBLD CKD-EPI 2021: 67.8 ML/MIN/1.73
EOSINOPHIL # BLD AUTO: 0.29 10*3/MM3 (ref 0–0.4)
EOSINOPHIL NFR BLD AUTO: 3.7 % (ref 0.3–6.2)
ERYTHROCYTE [DISTWIDTH] IN BLOOD BY AUTOMATED COUNT: 14.4 % (ref 12.3–15.4)
GLOBULIN UR ELPH-MCNC: 3.4 GM/DL
GLUCOSE SERPL-MCNC: 101 MG/DL (ref 65–99)
HCT VFR BLD AUTO: 36.9 % (ref 37.5–51)
HGB BLD-MCNC: 12.3 G/DL (ref 13–17.7)
IMM GRANULOCYTES # BLD AUTO: 0.15 10*3/MM3 (ref 0–0.05)
IMM GRANULOCYTES NFR BLD AUTO: 1.9 % (ref 0–0.5)
L PNEUMO1 AG UR QL IA: NEGATIVE
LYMPHOCYTES # BLD AUTO: 1.63 10*3/MM3 (ref 0.7–3.1)
LYMPHOCYTES NFR BLD AUTO: 20.8 % (ref 19.6–45.3)
MAGNESIUM SERPL-MCNC: 2 MG/DL (ref 1.6–2.4)
MCH RBC QN AUTO: 31.3 PG (ref 26.6–33)
MCHC RBC AUTO-ENTMCNC: 33.3 G/DL (ref 31.5–35.7)
MCV RBC AUTO: 93.9 FL (ref 79–97)
MONOCYTES # BLD AUTO: 0.8 10*3/MM3 (ref 0.1–0.9)
MONOCYTES NFR BLD AUTO: 10.2 % (ref 5–12)
NEUTROPHILS NFR BLD AUTO: 4.88 10*3/MM3 (ref 1.7–7)
NEUTROPHILS NFR BLD AUTO: 62.4 % (ref 42.7–76)
NRBC BLD AUTO-RTO: 0 /100 WBC (ref 0–0.2)
PHOSPHATE SERPL-MCNC: 3.5 MG/DL (ref 2.5–4.5)
PLATELET # BLD AUTO: 227 10*3/MM3 (ref 140–450)
PMV BLD AUTO: 10.2 FL (ref 6–12)
POTASSIUM SERPL-SCNC: 4.2 MMOL/L (ref 3.5–5.2)
PROT SERPL-MCNC: 6.6 G/DL (ref 6–8.5)
RBC # BLD AUTO: 3.93 10*6/MM3 (ref 4.14–5.8)
S PNEUM AG SPEC QL LA: NEGATIVE
SODIUM SERPL-SCNC: 137 MMOL/L (ref 136–145)
WBC NRBC COR # BLD AUTO: 7.83 10*3/MM3 (ref 3.4–10.8)

## 2024-09-11 PROCEDURE — 83735 ASSAY OF MAGNESIUM: CPT | Performed by: STUDENT IN AN ORGANIZED HEALTH CARE EDUCATION/TRAINING PROGRAM

## 2024-09-11 PROCEDURE — 87449 NOS EACH ORGANISM AG IA: CPT | Performed by: INTERNAL MEDICINE

## 2024-09-11 PROCEDURE — 85025 COMPLETE CBC W/AUTO DIFF WBC: CPT | Performed by: STUDENT IN AN ORGANIZED HEALTH CARE EDUCATION/TRAINING PROGRAM

## 2024-09-11 PROCEDURE — 97161 PT EVAL LOW COMPLEX 20 MIN: CPT

## 2024-09-11 PROCEDURE — 84100 ASSAY OF PHOSPHORUS: CPT | Performed by: INTERNAL MEDICINE

## 2024-09-11 PROCEDURE — 99232 SBSQ HOSP IP/OBS MODERATE 35: CPT | Performed by: INTERNAL MEDICINE

## 2024-09-11 PROCEDURE — 80053 COMPREHEN METABOLIC PANEL: CPT | Performed by: INTERNAL MEDICINE

## 2024-09-11 PROCEDURE — 87899 AGENT NOS ASSAY W/OPTIC: CPT | Performed by: INTERNAL MEDICINE

## 2024-09-11 PROCEDURE — 25010000002 PIPERACILLIN SOD-TAZOBACTAM PER 1 G: Performed by: STUDENT IN AN ORGANIZED HEALTH CARE EDUCATION/TRAINING PROGRAM

## 2024-09-11 RX ADMIN — CARBIDOPA AND LEVODOPA 1 TABLET: 25; 250 TABLET ORAL at 18:21

## 2024-09-11 RX ADMIN — APIXABAN 5 MG: 5 TABLET, FILM COATED ORAL at 20:35

## 2024-09-11 RX ADMIN — FOLIC ACID 1 MG: 1 TABLET ORAL at 08:51

## 2024-09-11 RX ADMIN — LISINOPRIL 5 MG: 5 TABLET ORAL at 08:52

## 2024-09-11 RX ADMIN — LEVOTHYROXINE SODIUM 25 MCG: 0.03 TABLET ORAL at 06:05

## 2024-09-11 RX ADMIN — CARBIDOPA AND LEVODOPA 1 TABLET: 25; 250 TABLET ORAL at 13:31

## 2024-09-11 RX ADMIN — PROPRANOLOL HYDROCHLORIDE 60 MG: 40 TABLET ORAL at 08:51

## 2024-09-11 RX ADMIN — PIPERACILLIN AND TAZOBACTAM 3.38 G: 3; .375 INJECTION, POWDER, FOR SOLUTION INTRAVENOUS at 15:37

## 2024-09-11 RX ADMIN — CARBIDOPA AND LEVODOPA 1 TABLET: 25; 250 TABLET ORAL at 20:34

## 2024-09-11 RX ADMIN — CETIRIZINE HYDROCHLORIDE 10 MG: 10 TABLET, FILM COATED ORAL at 08:52

## 2024-09-11 RX ADMIN — DIVALPROEX SODIUM 250 MG: 125 CAPSULE, COATED PELLETS ORAL at 20:38

## 2024-09-11 RX ADMIN — ATORVASTATIN CALCIUM 20 MG: 20 TABLET, FILM COATED ORAL at 08:51

## 2024-09-11 RX ADMIN — Medication 10 ML: at 21:27

## 2024-09-11 RX ADMIN — DIVALPROEX SODIUM 250 MG: 125 CAPSULE, COATED PELLETS ORAL at 08:52

## 2024-09-11 RX ADMIN — Medication 10 ML: at 08:52

## 2024-09-11 RX ADMIN — PIPERACILLIN AND TAZOBACTAM 3.38 G: 3; .375 INJECTION, POWDER, FOR SOLUTION INTRAVENOUS at 06:05

## 2024-09-11 RX ADMIN — APIXABAN 5 MG: 5 TABLET, FILM COATED ORAL at 08:51

## 2024-09-11 RX ADMIN — PROPRANOLOL HYDROCHLORIDE 60 MG: 40 TABLET ORAL at 20:34

## 2024-09-11 RX ADMIN — PIPERACILLIN AND TAZOBACTAM 3.38 G: 3; .375 INJECTION, POWDER, FOR SOLUTION INTRAVENOUS at 22:50

## 2024-09-11 RX ADMIN — CARBIDOPA AND LEVODOPA 1 TABLET: 25; 250 TABLET ORAL at 08:51

## 2024-09-11 RX ADMIN — CYANOCOBALAMIN TAB 500 MCG 1000 MCG: 500 TAB at 08:51

## 2024-09-11 NOTE — CONSULTS
Purpose of the visit was to evaluate for: goals of care/advanced care planning. Spoke with RN and family and discussed goals of care, care options, resuscitation status, and clarify code status.      Assessment:  RN, CCM ,Palliative Care spoke with nephew/POANidia, over the phone to introduce self and discuss goals of care.  Patient was a transfer from [facility] for [facility], with history of Parkinson's dementia, DVT, GERD, hypertension, hypothyroidism, dysphagia.  Family reports patient has been at [facility] in [place], for approximately 5-6 months.  Nidia would like for patient to be a DNR/DNI.  [place] MOST to be completed.  Plans are for patient to transfer back to [facility] by EMS at discharge.      Recommendations/Plan:  Return to [place] .    Tasks Completed: Code Status clarification and [place] MOST .    Vika OSEGUERA RN, CCM, Palliative Care

## 2024-09-11 NOTE — SIGNIFICANT NOTE
09/11/24 1045   Coping/Psychosocial   Observed Emotional State calm;cooperative   Verbalized Emotional State hopefulness   Trust Relationship/Rapport empathic listening provided   Involvement in Care interacting with patient   Additional Documentation Spiritual Care (Group)   Spiritual Care   Use of Spiritual Resources prayer   Spiritual Care Source  initiative   Spiritual Care Follow-Up follow-up, none required as presently assessed   Response to Spiritual Care receptive of support;engagement, unsatisfactory   Spiritual Care Interventions supportive conversation provided;prayer support provided   Spiritual Care Visit Type initial   Receptivity to Spiritual Care visit welcomed

## 2024-09-11 NOTE — THERAPY EVALUATION
Acute Care - Physical Therapy Initial Evaluation   [place]     Patient Name: Vic Glez  : 1940  MRN: [id number]  Today's Date: 2024      Visit Dx:     ICD-10-CM ICD-9-CM   1. Difficulty walking  R26.2 719.7     Patient Active Problem List   Diagnosis    Dysphagia    Pulmonary emboli    Parkinson's disease dementia    Seizure     Past Medical History:   Diagnosis Date    Arthritis     CKD (chronic kidney disease)     Dementia     Diabetes mellitus     DVT (deep vein thrombosis) in pregnancy     Hypertension     Parkinson's disease     Sleep apnea      History reviewed. No pertinent surgical history.  PT Assessment (Last 12 Hours)       PT Evaluation and Treatment       Row Name 24 1400          Physical Therapy Time and Intention    Subjective Information no complaints  -DP     Document Type evaluation  -DP     Mode of Treatment individual therapy;physical therapy  -DP     Patient Effort poor  -DP       Row Name 24 1400          General Information    Patient Profile Reviewed yes  -DP     Patient Observations poorly cooperative  -DP     General Observations of Patient Pt is a resident of a local nursing home. He is WC bound at baseline.  -DP     Prior Level of Function max assist:;bed mobility;ADL's;transfer  -DP     Barriers to Rehab previous functional deficit  -DP       Row Name 24 1400          Living Environment    Current Living Arrangements extended care facility  -DP     People in Home facility resident  -DP       Row Name 24 1400          Range of Motion (ROM)    Range of Motion bilateral lower extremities;ROM is WFL  -DP       Row Name 24 1400          Strength (Manual Muscle Testing)    Strength (Manual Muscle Testing) bilateral lower extremities  2-/5  -DP       Row Name 24 1400          Bed Mobility    Bed Mobility supine-sit-supine  -DP     Supine-Sit-Supine [place] (Bed Mobility) maximum assist (25% patient effort);dependent (less than 25%  patient effort)  -DP       Row Name 09/11/24 1400          Transfers    Comment, (Transfers) deferred  -DP       Row Name 09/11/24 1400          Gait/Stairs (Locomotion)    Comment, (Gait/Stairs) Pt is non ambulatory at baseline  -DP       Row Name             Wound 09/09/24 1317 gluteal MASD (Moisture associated skin damage)    Wound - Properties Group Placement Date: 09/09/24  -EVELIN Placement Time: 1317  -EVELIN Present on Original Admission: Y  -EVELIN Location: gluteal  -EVELIN Primary Wound Type: MASD  -EVELIN    Retired Wound - Properties Group Placement Date: 09/09/24  -EVELIN Placement Time: 1317  -EVELIN Present on Original Admission: Y  -EVELIN Location: gluteal  -EVELIN Primary Wound Type: MASD  -EVELIN    Retired Wound - Properties Group Date first assessed: 09/09/24  -EVELIN Time first assessed: 1317  -EVELIN Present on Original Admission: Y  -EVELIN Location: gluteal  -EVELIN Primary Wound Type: MASD  -EVELIN      Row Name 09/11/24 1400          Plan of Care Review    Plan of Care Reviewed With patient  -DP     Outcome Evaluation Patient is a long-term care resident at a local nursing home.  He is nonambulatory at baseline and dependent on staff for all bed mobilities, transfers, and ADLs.  He is not appropriate for inpatient PT services.  He will benefit from a PT consult upon return to the nursing home to maximize independence with functional mobility.  -DP       Row Name 09/11/24 1400          Positioning and Restraints    Pre-Treatment Position in bed  -DP     Post Treatment Position bed  -DP     In Bed call light within reach;encouraged to call for assist;exit alarm on  -DP       Row Name 09/11/24 1400          Therapy Assessment/Plan (PT)    Criteria for Skilled Interventions Met (PT) does not meet criteria for skilled intervention  -DP     Therapy Frequency (PT) evaluation only  -DP       Row Name 09/11/24 1400          PT Evaluation Complexity    History, PT Evaluation Complexity no personal factors and/or comorbidities  -DP     Examination of Body  Systems (PT Eval Complexity) total of 4 or more elements  -DP     Clinical Presentation (PT Evaluation Complexity) stable  -DP     Clinical Decision Making (PT Evaluation Complexity) low complexity  -DP     Overall Complexity (PT Evaluation Complexity) low complexity  -DP       Row Name 09/11/24 1400          Physical Therapy Goals    Problem Specific Goal Selection (PT) problem specific goal 1, PT  -DP       Row Name 09/11/24 1400          Problem Specific Goal 1 (PT)    Problem Specific Goal 1 (PT) Complete PT evaluation  -DP     Time Frame (Problem Specific Goal 1, PT) 1 day  -DP     Progress/Outcome (Problem Specific Goal 1, PT) goal met  -DP               User Key  (r) = Recorded By, (t) = Taken By, (c) = Cosigned By      Initials Name Provider Type    Fiordaliza Bates RN Registered Nurse    Ambreen Gamboa PT Physical Therapist                      PT Recommendation and Plan  Anticipated Discharge Disposition (PT): extended care facility  Therapy Frequency (PT): evaluation only  Plan of Care Reviewed With: patient  Outcome Evaluation: Patient is a long-term care resident at a local nursing home.  He is nonambulatory at baseline and dependent on staff for all bed mobilities, transfers, and ADLs.  He is not appropriate for inpatient PT services.  He will benefit from a PT consult upon return to the nursing home to maximize independence with functional mobility.   Outcome Measures       Row Name 09/11/24 1400             How much help from another person do you currently need...    Turning from your back to your side while in flat bed without using bedrails? 2  -DP      Moving from lying on back to sitting on the side of a flat bed without bedrails? 2  -DP      Moving to and from a bed to a chair (including a wheelchair)? 2  -DP      Standing up from a chair using your arms (e.g., wheelchair, bedside chair)? 2  -DP      Climbing 3-5 steps with a railing? 1  -DP      To walk in hospital room? 1  -DP       AM-PAC 6 Clicks Score (PT) 10  -DP      Highest Level of Mobility Goal 4 --> Transfer to chair/commode  -DP         Functional Assessment    Outcome Measure Options AM-PAC 6 Clicks Basic Mobility (PT)  -DP                User Key  (r) = Recorded By, (t) = Taken By, (c) = Cosigned By      Initials Name Provider Type    Ambreen Gamboa, PT Physical Therapist                     Time Calculation:    PT Charges       Row Name 09/11/24 1433             Time Calculation    PT Received On 09/11/24  -DP         Untimed Charges    PT Eval/Re-eval Minutes 25  -DP         Total Minutes    Untimed Charges Total Minutes 25  -DP       Total Minutes 25  -DP                User Key  (r) = Recorded By, (t) = Taken By, (c) = Cosigned By      Initials Name Provider Type    Ambreen Gamboa, PT Physical Therapist                      PT G-Codes  Outcome Measure Options: AM-PAC 6 Clicks Basic Mobility (PT)  AM-PAC 6 Clicks Score (PT): 10    Ambreen Rodriguez, PT  9/11/2024

## 2024-09-11 NOTE — PLAN OF CARE
Goal Outcome Evaluation:           Progress: no change  Outcome Evaluation: Patient confused this shift. Frequently request staff prescence in room. Turned and repositioned frequently throughout shift. Attempted to get out of bed unassisted. Vitals stable and denies pain at this time. Will continue with care per  POC

## 2024-09-11 NOTE — PROGRESS NOTES
[facility]   Hospitalist Progress Note  Date: 2024  Patient Name: Vic Glez  : 1940  MRN: [id number]  Date of admission: 2024    Subjective   Subjective     Chief Complaint:   Altered mental status    Summary:   Vic Glez is a 84 y.o. male past medical history of Parkinson disease, bilateral pulmonary embolisms/left lower extremity DVT on Eliquis, hypertension, hyperlipidemia, who initially presented to [facility] with altered mental status on 2024.  Initially was believed to be secondary to urinary tract infection until the morning of 2024 when nursing staff noted he was significantly diaphoretic and unresponsive.  He at that time he was febrile with an elevated lactic but no leukocytosis.  He was started on broad antibiotics and given fluid resuscitation.  Lactic acidosis improved and patient had improved back to baseline within an hour.  However over the course of 2 days he had intermittent episodes of what was reported as possible seizure activity but it seemed he could also shake his head yes and no while this was going on.  Apparently he has had similar episodes at his nursing home prior to this admission.  Due to the need for neurology evaluation request was made to transfer to our facility.  Patient was accepted and transferred in stable condition.  Upon arrival here patient is hemodynamically stable and in his normal state of health.  Lab workup is unremarkable including a normal lactate.  Troponin mildly elevated but down trended on repeat.  Patient had 2 head CTs (most recent 2024) done at [facility] and neither showed any acute abnormalities but did show multiple chronic white matter changes.  Upon arrival to our facility patient is alert and oriented.  Denies recollecting the seizure episodes.  Denies having any headache.  No history of seizures.  Patient denies any dysuria currently.  Patient evaluated by teleneurology, they felt the patient symptoms likely  metabolic encephalopathy exacerbating his underlying Parkinson's disease, patient has been treated with broad-spectrum antibiotics and seems to be improving clinically, patient is a long-term resident at [facility], he is a bit old and can return once acute issues resolve    Interval Followup:   No acute events overnight, patient resting comfortably in bed, patient continues to tolerate antibiotics, culture results have been requested from outside hospital and these are pending    Objective   Objective     Vitals:   Temp:  [97 °F (36.1 °C)-98.1 °F (36.7 °C)] 98 °F (36.7 °C)  Heart Rate:  [66-76] 66  Resp:  [16-24] 20  BP: (101-131)/(66-85) 123/83    Physical Exam   GEN: No acute distress  HEENT: Moist mucous membranes  LUNGS: Equal chest rise bilaterally  CARDIAC: Regular rate and rhythm  NEURO: Moving all 4 extremities spontaneously  SKIN: No obvious breakdown    Result Review    Result Review:  I have personally reviewed the results as below  [x]  Laboratory CBC, CMP personally reviewed  CBC          9/9/2024    05:26 9/10/2024    05:13 9/11/2024    05:01   CBC   WBC 6.92  7.61  7.83    RBC 3.78  3.72  3.93    Hemoglobin 11.5  11.3  12.3    Hematocrit 35.0  35.4  36.9    MCV 92.6  95.2  93.9    MCH 30.4  30.4  31.3    MCHC 32.9  31.9  33.3    RDW 14.1  14.4  14.4    Platelets 203  200  227      CMP          9/9/2024    05:26 9/10/2024    05:13 9/11/2024    05:01   CMP   Glucose 100  99  101    BUN 19  20  23    Creatinine 0.93  1.06  1.09    EGFR 82.0  70.1  67.8    Sodium 139  138  137    Potassium 4.6  4.1  4.2    Chloride 108  105  103    Calcium 9.0  9.2  9.0    Total Protein  6.3  6.6    Albumin  3.1  3.2    Globulin  3.2  3.4    Total Bilirubin  0.4  0.3    Alkaline Phosphatase  45  45    AST (SGOT)  12  12    ALT (SGPT)  <5  <5    Albumin/Globulin Ratio  1.0  0.9    BUN/Creatinine Ratio 20.4  18.9  21.1    Anion Gap 8.5  9.3  8.2      []  Microbiology  []  Radiology  []  EKG/Telemetry   []   Cardiology/Vascular   []  Pathology  []  Old records  []  Other:    Scheduled medications:  apixaban, 5 mg, Oral, BID  atorvastatin, 20 mg, Oral, Daily  carbidopa-levodopa, 1 tablet, Oral, 4x Daily  cetirizine, 10 mg, Oral, Daily  Divalproex Sodium, 250 mg, Oral, BID  folic acid, 1 mg, Oral, Daily  levothyroxine, 25 mcg, Oral, Q AM  lisinopril, 5 mg, Oral, Daily  piperacillin-tazobactam, 3.375 g, Intravenous, Q8H  propranolol, 60 mg, Oral, BID  sodium chloride, 10 mL, Intravenous, Q12H  vitamin B-12, 1,000 mcg, Oral, Daily      As needed medications:    aluminum-magnesium hydroxide-simethicone    senna-docusate sodium **AND** polyethylene glycol **AND** bisacodyl **AND** bisacodyl    LORazepam    nitroglycerin    sodium chloride    sodium chloride  Infusions:            Assessment & Plan   Assessment / Plan     Assessment:  Acute metabolic encephalopathy  Possible seizure activity  Complicated urinary tract infection  Sepsis with unclear source (fever, tachypnea, lactic acidosis at offsite facility on 09/06/2024)  Elevated troponin likely secondary to demand ischemia type II MI due to above processes  History of bilateral PE/left lower extremity DVT  Parkinson disease  Hypertension  Hyperlipidemia  Hypothyroidism    Plan:  Patient admitted to the hospital for further workup and management of above  Encephalopathy with unclear etiology, initially was being treated by UTI for outside facilities and episode of fever, tachypnea with possible shaking-like episode, continuing on Zosyn currently, have requested records from outside hospital to help with antibiotic de-escalation  Patient evaluated by teleneurology, appreciate their recommendations  Blood culture no growth at 24 hours, influenza negative, COVID-19 negative  Mycoplasma, Legionella, strep pneumo ordered and pending  Continue seizure precautions,  EEG reviewed, no elliptic form discharges noted  TSH elevated, will need repeat when acute issues resolve,  continue home Synthroid  Ammonia level reviewed and normal  Procalcitonin negative  White blood cell count normal  Continue home Eliquis for PE/DVT  Continue Sinemet  Continue home antihypertensives  CBC, CMP reviewed 9/11/2024   Repeat CBC, CMP, mag and Phos in a.m. 9/11/2024      Reviewed patients labs and imaging, and discussed with patient and nurse at bedside.    VTE Prophylaxis:  Pharmacologic & mechanical VTE prophylaxis orders are present.    Disposition: Back to long-term care facility once acute issues resolved    CODE STATUS:   Code Status (Patient has no pulse and is not breathing): CPR (Attempt to Resuscitate)  Medical Interventions (Patient has pulse or is breathing): Full Support      Electronically signed by Mayito Camarena MD, 9/11/2024, 09:25 EDT.

## 2024-09-11 NOTE — PLAN OF CARE
Goal Outcome Evaluation:            Patient asleep most of day, frequently falling asleep during shift. No other complaints at this time.

## 2024-09-12 LAB
ALBUMIN SERPL-MCNC: 3.1 G/DL (ref 3.5–5.2)
ALBUMIN/GLOB SERPL: 1 G/DL
ALP SERPL-CCNC: 41 U/L (ref 39–117)
ALT SERPL W P-5'-P-CCNC: <5 U/L (ref 1–41)
ANION GAP SERPL CALCULATED.3IONS-SCNC: 7.4 MMOL/L (ref 5–15)
AST SERPL-CCNC: 12 U/L (ref 1–40)
BASOPHILS # BLD AUTO: 0.08 10*3/MM3 (ref 0–0.2)
BASOPHILS NFR BLD AUTO: 1.1 % (ref 0–1.5)
BILIRUB SERPL-MCNC: 0.3 MG/DL (ref 0–1.2)
BUN SERPL-MCNC: 24 MG/DL (ref 8–23)
BUN/CREAT SERPL: 20.5 (ref 7–25)
CALCIUM SPEC-SCNC: 8.7 MG/DL (ref 8.6–10.5)
CHLORIDE SERPL-SCNC: 108 MMOL/L (ref 98–107)
CO2 SERPL-SCNC: 24.6 MMOL/L (ref 22–29)
CREAT SERPL-MCNC: 1.17 MG/DL (ref 0.76–1.27)
DEPRECATED RDW RBC AUTO: 49.8 FL (ref 37–54)
EGFRCR SERPLBLD CKD-EPI 2021: 62.2 ML/MIN/1.73
EOSINOPHIL # BLD AUTO: 0.23 10*3/MM3 (ref 0–0.4)
EOSINOPHIL NFR BLD AUTO: 3.1 % (ref 0.3–6.2)
ERYTHROCYTE [DISTWIDTH] IN BLOOD BY AUTOMATED COUNT: 14.6 % (ref 12.3–15.4)
GLOBULIN UR ELPH-MCNC: 3.1 GM/DL
GLUCOSE SERPL-MCNC: 103 MG/DL (ref 65–99)
HCT VFR BLD AUTO: 35.6 % (ref 37.5–51)
HGB BLD-MCNC: 11.4 G/DL (ref 13–17.7)
IMM GRANULOCYTES # BLD AUTO: 0.13 10*3/MM3 (ref 0–0.05)
IMM GRANULOCYTES NFR BLD AUTO: 1.7 % (ref 0–0.5)
LYMPHOCYTES # BLD AUTO: 1.8 10*3/MM3 (ref 0.7–3.1)
LYMPHOCYTES NFR BLD AUTO: 23.9 % (ref 19.6–45.3)
M PNEUMO IGM SER IA-ACNC: <770 U/ML (ref 0–769)
MAGNESIUM SERPL-MCNC: 2 MG/DL (ref 1.6–2.4)
MCH RBC QN AUTO: 30.2 PG (ref 26.6–33)
MCHC RBC AUTO-ENTMCNC: 32 G/DL (ref 31.5–35.7)
MCV RBC AUTO: 94.4 FL (ref 79–97)
MONOCYTES # BLD AUTO: 0.77 10*3/MM3 (ref 0.1–0.9)
MONOCYTES NFR BLD AUTO: 10.2 % (ref 5–12)
NEUTROPHILS NFR BLD AUTO: 4.53 10*3/MM3 (ref 1.7–7)
NEUTROPHILS NFR BLD AUTO: 60 % (ref 42.7–76)
NRBC BLD AUTO-RTO: 0 /100 WBC (ref 0–0.2)
PHOSPHATE SERPL-MCNC: 3.4 MG/DL (ref 2.5–4.5)
PLATELET # BLD AUTO: 214 10*3/MM3 (ref 140–450)
PMV BLD AUTO: 10.1 FL (ref 6–12)
POTASSIUM SERPL-SCNC: 4.1 MMOL/L (ref 3.5–5.2)
PROT SERPL-MCNC: 6.2 G/DL (ref 6–8.5)
RBC # BLD AUTO: 3.77 10*6/MM3 (ref 4.14–5.8)
SODIUM SERPL-SCNC: 140 MMOL/L (ref 136–145)
WBC NRBC COR # BLD AUTO: 7.54 10*3/MM3 (ref 3.4–10.8)

## 2024-09-12 PROCEDURE — 85025 COMPLETE CBC W/AUTO DIFF WBC: CPT | Performed by: INTERNAL MEDICINE

## 2024-09-12 PROCEDURE — 99232 SBSQ HOSP IP/OBS MODERATE 35: CPT | Performed by: INTERNAL MEDICINE

## 2024-09-12 PROCEDURE — 80053 COMPREHEN METABOLIC PANEL: CPT | Performed by: INTERNAL MEDICINE

## 2024-09-12 PROCEDURE — 99233 SBSQ HOSP IP/OBS HIGH 50: CPT | Performed by: FAMILY MEDICINE

## 2024-09-12 PROCEDURE — 84100 ASSAY OF PHOSPHORUS: CPT | Performed by: INTERNAL MEDICINE

## 2024-09-12 PROCEDURE — 25010000002 PIPERACILLIN SOD-TAZOBACTAM PER 1 G: Performed by: STUDENT IN AN ORGANIZED HEALTH CARE EDUCATION/TRAINING PROGRAM

## 2024-09-12 PROCEDURE — 83735 ASSAY OF MAGNESIUM: CPT | Performed by: INTERNAL MEDICINE

## 2024-09-12 RX ADMIN — PROPRANOLOL HYDROCHLORIDE 60 MG: 40 TABLET ORAL at 20:34

## 2024-09-12 RX ADMIN — PIPERACILLIN AND TAZOBACTAM 3.38 G: 3; .375 INJECTION, POWDER, FOR SOLUTION INTRAVENOUS at 15:14

## 2024-09-12 RX ADMIN — CARBIDOPA AND LEVODOPA 1 TABLET: 25; 250 TABLET ORAL at 12:34

## 2024-09-12 RX ADMIN — CARBIDOPA AND LEVODOPA 1 TABLET: 25; 250 TABLET ORAL at 08:26

## 2024-09-12 RX ADMIN — APIXABAN 5 MG: 5 TABLET, FILM COATED ORAL at 20:34

## 2024-09-12 RX ADMIN — PROPRANOLOL HYDROCHLORIDE 60 MG: 40 TABLET ORAL at 08:26

## 2024-09-12 RX ADMIN — CETIRIZINE HYDROCHLORIDE 10 MG: 10 TABLET, FILM COATED ORAL at 08:27

## 2024-09-12 RX ADMIN — LEVOTHYROXINE SODIUM 25 MCG: 0.03 TABLET ORAL at 05:29

## 2024-09-12 RX ADMIN — Medication 10 ML: at 09:55

## 2024-09-12 RX ADMIN — CYANOCOBALAMIN TAB 500 MCG 1000 MCG: 500 TAB at 08:27

## 2024-09-12 RX ADMIN — Medication 10 ML: at 20:34

## 2024-09-12 RX ADMIN — PIPERACILLIN AND TAZOBACTAM 3.38 G: 3; .375 INJECTION, POWDER, FOR SOLUTION INTRAVENOUS at 06:19

## 2024-09-12 RX ADMIN — ATORVASTATIN CALCIUM 20 MG: 20 TABLET, FILM COATED ORAL at 08:26

## 2024-09-12 RX ADMIN — APIXABAN 5 MG: 5 TABLET, FILM COATED ORAL at 08:27

## 2024-09-12 RX ADMIN — PIPERACILLIN AND TAZOBACTAM 3.38 G: 3; .375 INJECTION, POWDER, FOR SOLUTION INTRAVENOUS at 21:41

## 2024-09-12 RX ADMIN — CARBIDOPA AND LEVODOPA 1 TABLET: 25; 250 TABLET ORAL at 17:49

## 2024-09-12 RX ADMIN — LISINOPRIL 5 MG: 5 TABLET ORAL at 08:27

## 2024-09-12 RX ADMIN — FOLIC ACID 1 MG: 1 TABLET ORAL at 08:26

## 2024-09-12 RX ADMIN — DIVALPROEX SODIUM 250 MG: 125 CAPSULE, COATED PELLETS ORAL at 08:26

## 2024-09-12 RX ADMIN — DIVALPROEX SODIUM 250 MG: 125 CAPSULE, COATED PELLETS ORAL at 20:34

## 2024-09-12 RX ADMIN — CARBIDOPA AND LEVODOPA 1 TABLET: 25; 250 TABLET ORAL at 20:34

## 2024-09-12 NOTE — PLAN OF CARE
Goal Outcome Evaluation:  Plan of Care Reviewed With: patient           Outcome Evaluation: Pt is alert to self and place, on 2L NC, and bedrest. Pt slept on and off throughout shift. All medications given as ordered. Safety checks maintained throughout shift with pt resting in bed, bed in lowest position, wheels to bed locked, and personal items and call light within reach. Skin care completed as ordered, rotating between orange and blue top barrier cream. VSS.

## 2024-09-12 NOTE — PROGRESS NOTES
[facility]   Hospitalist Progress Note  Date: 2024  Patient Name: Vic Glez  : 1940  MRN: [id number]  Date of admission: 2024    Subjective   Subjective     Chief Complaint:   Altered mental status    Summary:   Vic Glez is a 84 y.o. male past medical history of Parkinson disease, bilateral pulmonary embolisms/left lower extremity DVT on Eliquis, hypertension, hyperlipidemia, who initially presented to [place] with altered mental status on 2024.  Initially was believed to be secondary to urinary tract infection until the morning of 2024 when nursing staff noted he was significantly diaphoretic and unresponsive.  He at that time he was febrile with an elevated lactic but no leukocytosis.  He was started on broad antibiotics and given fluid resuscitation.  Lactic acidosis improved and patient had improved back to baseline within an hour.  However over the course of 2 days he had intermittent episodes of what was reported as possible seizure activity but it seemed he could also shake his head yes and no while this was going on.  Apparently he has had similar episodes at his nursing home prior to this admission.  Due to the need for neurology evaluation request was made to transfer to our facility.  Patient was accepted and transferred in stable condition.  Upon arrival here patient is hemodynamically stable and in his normal state of health.  Lab workup is unremarkable including a normal lactate.  Troponin mildly elevated but down trended on repeat.  Patient had 2 head CTs (most recent 2024) done at [place] and neither showed any acute abnormalities but did show multiple chronic white matter changes.  Upon arrival to our facility patient is alert and oriented.  Denies recollecting the seizure episodes.  Denies having any headache.  No history of seizures.  Patient denies any dysuria currently.  Patient evaluated by teleneurology, they felt the patient symptoms likely  metabolic encephalopathy exacerbating his underlying Parkinson's disease, patient has been treated with broad-spectrum antibiotics and seems to be improving clinically, patient is a long-term resident at [facility], he is a bit old and can return once acute issues resolve    Interval Followup:   No acute events overnight, patient resting comfortably in bed, patient continues to tolerate antibiotics, urine culture from [place] showed skin jocelyn.    Objective   Objective     Vitals:   Temp:  [97.7 °F (36.5 °C)-98.1 °F (36.7 °C)] 98.1 °F (36.7 °C)  Heart Rate:  [65-68] 68  Resp:  [14-18] 16  BP: ()/(58-72) 99/58  Flow (L/min):  [2] 2    Physical Exam   GEN: No acute distress  HEENT: Moist mucous membranes  LUNGS: Equal chest rise bilaterally  CARDIAC: Regular rate and rhythm  NEURO: Moving all 4 extremities spontaneously  SKIN: No obvious breakdown    Result Review    Result Review:  I have personally reviewed the results as below  [x]  Laboratory CBC, CMP personally reviewed  CBC          9/10/2024    05:13 9/11/2024    05:01 9/12/2024    05:29   CBC   WBC 7.61  7.83  7.54    RBC 3.72  3.93  3.77    Hemoglobin 11.3  12.3  11.4    Hematocrit 35.4  36.9  35.6    MCV 95.2  93.9  94.4    MCH 30.4  31.3  30.2    MCHC 31.9  33.3  32.0    RDW 14.4  14.4  14.6    Platelets 200  227  214      CMP          9/10/2024    05:13 9/11/2024    05:01 9/12/2024    05:29   CMP   Glucose 99  101  103    BUN 20  23  24    Creatinine 1.06  1.09  1.17    EGFR 70.1  67.8  62.2    Sodium 138  137  140    Potassium 4.1  4.2  4.1    Chloride 105  103  108    Calcium 9.2  9.0  8.7    Total Protein 6.3  6.6  6.2    Albumin 3.1  3.2  3.1    Globulin 3.2  3.4  3.1    Total Bilirubin 0.4  0.3  0.3    Alkaline Phosphatase 45  45  41    AST (SGOT) 12  12  12    ALT (SGPT) <5  <5  <5    Albumin/Globulin Ratio 1.0  0.9  1.0    BUN/Creatinine Ratio 18.9  21.1  20.5    Anion Gap 9.3  8.2  7.4      []  Microbiology  []   Radiology  []  EKG/Telemetry   []  Cardiology/Vascular   []  Pathology  []  Old records  []  Other:    Scheduled medications:  apixaban, 5 mg, Oral, BID  atorvastatin, 20 mg, Oral, Daily  carbidopa-levodopa, 1 tablet, Oral, 4x Daily  cetirizine, 10 mg, Oral, Daily  Divalproex Sodium, 250 mg, Oral, BID  folic acid, 1 mg, Oral, Daily  levothyroxine, 25 mcg, Oral, Q AM  lisinopril, 5 mg, Oral, Daily  piperacillin-tazobactam, 3.375 g, Intravenous, Q8H  propranolol, 60 mg, Oral, BID  sodium chloride, 10 mL, Intravenous, Q12H  vitamin B-12, 1,000 mcg, Oral, Daily      As needed medications:    aluminum-magnesium hydroxide-simethicone    senna-docusate sodium **AND** polyethylene glycol **AND** bisacodyl **AND** bisacodyl    nitroglycerin    sodium chloride    sodium chloride  Infusions:            Assessment & Plan   Assessment / Plan     Assessment:  Acute metabolic encephalopathy.  Improving  Possible seizure activity  ?  Complicated urinary tract infection  Sepsis with unclear source (fever, tachypnea, lactic acidosis at offsite facility on 09/06/2024).  Resolved  Elevated troponin likely secondary to demand ischemia type II MI due to above processes  History of bilateral PE/left lower extremity DVT  Parkinson disease.?  Parkinson's dementia  Hypertension  Hyperlipidemia  Hypothyroidism    Plan:  Patient admitted to the hospital for further workup and management of above  Encephalopathy with unclear etiology, initially was being treated by UTI for outside facilities and episode of fever, tachypnea with possible shaking-like episode, continuing on Zosyn currently, urine culture showed mixed jocelyn.    Patient evaluated by teleneurology, appreciate their recommendations.  Teleneuro has signed off  Blood culture no growth to date, influenza negative, COVID-19 negative  Mycoplasma, Legionella, strep pneumo ordered and pending  Continue seizure precautions,  EEG reviewed, no elliptic form discharges noted  TSH elevated, will  need repeat when acute issues resolve, continue home Synthroid  Ammonia level reviewed and normal  Procalcitonin negative  White blood cell count normal  Continue home Eliquis for PE/DVT  Continue Sinemet  Continue home antihypertensives  CBC, CMP reviewed 9/12/2024   DC Hauser catheter.  Per nursing staff this was placed during recent hospitalization  Discussed with palliative care MOST form signed.     Reviewed patients labs and imaging, and discussed with patient and nurse at bedside.  Discussed with     VTE Prophylaxis:  Pharmacologic & mechanical VTE prophylaxis orders are present.    [facility]    CODE STATUS:   Medical Intervention Limits: No intubation (DNI)  Level Of Support Discussed With: Health Care Surrogate  Code Status (Patient has no pulse and is not breathing): No CPR (Do Not Attempt to Resuscitate)  Medical Interventions (Patient has pulse or is breathing): Limited Support        Electronically signed by Smooth Chavez MD, 09/12/24, 5:30 PM EDT.

## 2024-09-12 NOTE — PLAN OF CARE
Goal Outcome Evaluation:patient     No change            Progress: no change  Outcome Evaluation: Awake on and off throughout the night. Care clustered to allow patient to rest. Vitals table. Will continue care per POC

## 2024-09-12 NOTE — CONSULTS
[facility]  [facility] Tele[facility]urology Consult Services    Routine Consult Follow-Up    Patient Name:   Vic Glez  YOB: 1940  Identification Number:   MRN - [id number]  Date of Service:   09/12/2024 11:39:19    Diagnosis        G93.41 - Encephalopathy Metabolic        F05.0 - Delirium not superimposed on dementia, so described    Impression  Idiopathic Parkinson's disease. Patient bradykinetic, tremulous on examination this morning. Follows two step commands reliably. More awake this morning. Notable improvement in attention noted.    Patient showing signs of delirium in the setting of metabolic encephalopathy likely d/t infectious state, antibiotics, Notable elevations to TSH. Less likely seizure activity but will evaluate and follow.  Encephalopathy improving.    MRI, EEG WNL    Recommendations:  - Further infectious/metabolic w/u per primary team.  - Delirium precautions.  - Continue eliquis.  - Continue home sinemet  -- Will need in person examination by neurologist to fully evaluate parkinsonian features for medication adjustment PRN. Recommend outpatient f/u after acute phase.    - If continuing to evaluate for infection, would consider ID consultation.  -- If patient shows decline, or has unknown source of fevers, would consider LP w/ basic and infectious studies. Afebrile with no growth on BCx at this time.    - Will sign off at this time. Please reconsult as necessary.    Our recommendations are outlined below    Nursing Recommendations :  Delirium precautions: Blinds open during the day, closed at night, frequent reorientation, minimize nighttime interruptionsWhen possible avoid benzodiazepines, opioid pain medications, and anticholinergic medications    Disposition :  Neurology will sign off. Reconsult if Needed.    Subjective  Exam improved today. More awake and briskly responsive.    Hospital Course  History of Present Illness:  Patient is a 84 year old Male.  Patient seen  at bedside. He just received his sinemet dose.  He shows bilateral R>L tremulousness involving UE>LE.  He is briskly responsive to questions.  He is hypophonic and delayed in his speech production, but will nod or shake his head appropriately to questions. He can follow two step commands reliably.  Patient is currently on ABx therapy.  Patient knows that he is on depakote and eliquis, but is unable to state why.  Patient notes that he is not at home. He notes that he has not been confused since being in the hospital  TSH is notably high.  Patient has no questions at this time.    Hospital Course:  Vic Glez is a 84 y.o. male past medical history of Parkinson disease, bilateral pulmonary embolisms/left lower extremity DVT on Eliquis, hypertension, hyperlipidemia, who initially presented to [place] with altered mental status on 903 2024. Initially was believed to be secondary to urinary tract infection until the morning of 09/06/2024 when nursing staff noted he was significantly diaphoretic and unresponsive. He at that time he was febrile with an elevated lactic but no leukocytosis. He was started on broad antibiotics and given fluid resuscitation. Lactic acidosis improved and patient had improved back to baseline within an hour. However over the course of 2 days he had intermittent episodes of what was reported as possible seizure activity but it seemed he could also shake his head yes and no while this was going on. Apparently he has had similar episodes at his nursing home prior to this admission. Due to the need for neurology evaluation request was made to transfer to our facility. Patient was accepted and transferred in stable condition. Upon arrival here patient is hemodynamically stable and in his normal state of health. Lab workup is unremarkable including a normal lactate. Troponin mildly elevated but down trended on repeat. Patient had 2 head CTs (most recent 09/06/2024) done at [place] and neither  showed any acute abnormalities but did show multiple chronic white matter changes. Upon arrival to our facility patient is alert and oriented. Denies recollecting the seizure episodes. Denies having any headache. No history of seizures. Patient denies any dysuria currently.          Imaging  MRI Brain 9/4  There is moderate cerebral atrophy with widening of the extra-axial spaces and ventricular dilatation.  There are multiple white matter hyperintensities, distributed throughout the deep white matter tracts of the cerebral hemispheres, consistent with moderate chronic white matter ischemic changes.  There is no evidence for recent intracranial ischemia or other cause of cytotoxic edema on diffusion weighted imaging (DWI).  Normal T2* images of the brain without demonstrated susceptibility artifact.    EEG shows no seizure activity    Labs  TSH 8.5  Ammonia WNL      Examination  BP(99//56),    Neuro Exam:  General: Alert,Awake, Oriented, Place, Person  Will follow two step commands reliably. Notable improvement to bradykinesia in movement and responses.    Speech: Dysarthric:  Hypophonic and slowed. Dysarthric. No aphasia noted. Smiling.    Language: Intact:    Face: Symmetric:    Facial Sensation: Intact:    Visual Fields: Intact:    Extraocular Movements: Intact:    Motor Exam: No Drift:  Generalized deconditioning throughout. Notably tremulous R>L.    Sensation: Intact:    Coordination: Intact:  Not out of proportion to weakness and tremulousness.          This consult was conducted in real time using interactive audio and video technology. Patient was informed of the technology being used for this visit and agreed to proceed. Patient located in hospital and provider located at home/office setting.    Telehealth Neurology consultation was provided. I spent minutes providing telehealth care. This includes time spent for face to face visit via telemedicine, review of medical records, imaging studies and discussion  of findings with providers, the patient and/or family.      Dr Watson Garcia      [facility]  For Inpatient follow-up with [facility] physician please call [facility] [phone]. This is not an outpatient service. Post hospital discharge, please contact hospital directly.    Please do not communicate with [facility] physicians via secure chat. If you have any questions, Please contact [facility].  Please call or reconsult our service if there are any clinical or diagnostic changes.

## 2024-09-12 NOTE — NURSING NOTE
Palliative followed up with patient.  [place] MOST form completed, faxed to medical records and place in patients chart.  Plans are for patients discharge by to [facility] at discharge.  Will need EMS for transportation.    At this time, no further needs identified.  Palliative Care will sign off.  If new needs arise, please place a new palliative care consult.    Vika OSEGUERA RN, [facility]  Palliative Care

## 2024-09-13 VITALS
TEMPERATURE: 97.9 F | DIASTOLIC BLOOD PRESSURE: 75 MMHG | HEART RATE: 62 BPM | RESPIRATION RATE: 16 BRPM | OXYGEN SATURATION: 98 % | SYSTOLIC BLOOD PRESSURE: 123 MMHG | HEIGHT: 71 IN | WEIGHT: 182.98 LBS | BODY MASS INDEX: 25.62 KG/M2

## 2024-09-13 PROBLEM — R56.9 SEIZURE: Status: RESOLVED | Noted: 2024-09-08 | Resolved: 2024-09-13

## 2024-09-13 LAB
BACTERIA SPEC AEROBE CULT: NORMAL
BACTERIA SPEC AEROBE CULT: NORMAL

## 2024-09-13 PROCEDURE — 25010000002 PIPERACILLIN SOD-TAZOBACTAM PER 1 G: Performed by: STUDENT IN AN ORGANIZED HEALTH CARE EDUCATION/TRAINING PROGRAM

## 2024-09-13 PROCEDURE — 99239 HOSP IP/OBS DSCHRG MGMT >30: CPT | Performed by: INTERNAL MEDICINE

## 2024-09-13 RX ADMIN — PROPRANOLOL HYDROCHLORIDE 60 MG: 40 TABLET ORAL at 08:29

## 2024-09-13 RX ADMIN — Medication 10 ML: at 08:30

## 2024-09-13 RX ADMIN — LEVOTHYROXINE SODIUM 25 MCG: 0.03 TABLET ORAL at 06:03

## 2024-09-13 RX ADMIN — CYANOCOBALAMIN TAB 500 MCG 1000 MCG: 500 TAB at 08:29

## 2024-09-13 RX ADMIN — PIPERACILLIN AND TAZOBACTAM 3.38 G: 3; .375 INJECTION, POWDER, FOR SOLUTION INTRAVENOUS at 06:03

## 2024-09-13 RX ADMIN — DIVALPROEX SODIUM 250 MG: 125 CAPSULE, COATED PELLETS ORAL at 08:30

## 2024-09-13 RX ADMIN — APIXABAN 5 MG: 5 TABLET, FILM COATED ORAL at 08:29

## 2024-09-13 RX ADMIN — ATORVASTATIN CALCIUM 20 MG: 20 TABLET, FILM COATED ORAL at 08:29

## 2024-09-13 RX ADMIN — FOLIC ACID 1 MG: 1 TABLET ORAL at 08:29

## 2024-09-13 RX ADMIN — CARBIDOPA AND LEVODOPA 1 TABLET: 25; 250 TABLET ORAL at 12:38

## 2024-09-13 RX ADMIN — CARBIDOPA AND LEVODOPA 1 TABLET: 25; 250 TABLET ORAL at 08:29

## 2024-09-13 RX ADMIN — CETIRIZINE HYDROCHLORIDE 10 MG: 10 TABLET, FILM COATED ORAL at 08:29

## 2024-09-13 RX ADMIN — LISINOPRIL 5 MG: 5 TABLET ORAL at 08:29

## 2024-09-13 NOTE — SIGNIFICANT NOTE
Wound Eval / Progress Noted    KARY Garner     Patient Name: Vic Glez  : 1940  MRN: [id number]  Today's Date: 2024                 Admit Date: 2024    Visit Dx:    ICD-10-CM ICD-9-CM   1. Difficulty walking  R26.2 719.7         * No active hospital problems. *        Past Medical History:   Diagnosis Date    Arthritis     CKD (chronic kidney disease)     Dementia     Diabetes mellitus     DVT (deep vein thrombosis) in pregnancy     Hypertension     Parkinson's disease     Sleep apnea         History reviewed. No pertinent surgical history.      Physical Assessment:  Wound 24 1317 gluteal MASD (Moisture associated skin damage) (Active)   Wound Image   24 1415   Dressing Appearance open to air 24 1415   Closure None 24 141   Base moist;red;pink;blanchable 24   Periwound dry;blanchable;redness;moist;pale white 24 141   Periwound Temperature warm 24 141   Periwound Skin Turgor soft 24 1415   Edges open 24 1415   Drainage Characteristics/Odor serosanguineous 24 141   Drainage Amount small 24 1415   Care, Wound cleansed with;sterile normal saline 24 141   Dressing Care dressing applied;silver impregnated;hydrofiber;silicone;border dressing 24 141   Periwound Care absorptive dressing applied 24 141       Wound 24 1415 Right posterior ankle Traumatic (Active)   Wound Image   24 1415   Dressing Appearance open to air 24 1415   Closure None 24 1415   Base dry;blanchable;red;other (see comments) 24 141   Periwound dry;pink 24 141   Periwound Temperature warm 24 141   Periwound Skin Turgor soft 24 141   Edges open 24 141   Drainage Amount none 24 1415   Care, Wound cleansed with;sterile normal saline 24 141   Dressing Care dressing applied;non-adherent;petroleum-based;gauze;silicone;border dressing 24 1415   Periwound Care dry periwound  area maintained 09/13/24 1415       Wound 09/13/24 1415 Left posterior ankle Traumatic (Active)   Wound Image   09/13/24 1415   Dressing Appearance open to air 09/13/24 1415   Closure None 09/13/24 1415   Base moist;red;blanchable 09/13/24 1415   Red (%), Wound Tissue Color 100 09/13/24 1415   Periwound dry;pink;other (see comments) 09/13/24 1415   Periwound Temperature warm 09/13/24 1415   Periwound Skin Turgor soft 09/13/24 1415   Edges open 09/13/24 1415   Drainage Characteristics/Odor serosanguineous 09/13/24 1415   Drainage Amount scant 09/13/24 1415   Care, Wound cleansed with;sterile normal saline 09/13/24 1415   Dressing Care dressing applied;non-adherent;petroleum-based;gauze;silicone;border dressing 09/13/24 1415   Periwound Care absorptive dressing applied 09/13/24 1415       Wound 09/13/24 1415 Right groin MASD (Moisture associated skin damage) (Active)   Wound Image   09/13/24 1415   Dressing Appearance open to air 09/13/24 1415   Closure None 09/13/24 1415   Base closed/resurfaced 09/13/24 1415   Periwound dry;pink;redness;other (see comments) 09/13/24 1415   Periwound Temperature warm 09/13/24 1415   Periwound Skin Turgor soft 09/13/24 1415   Edges rolled/closed 09/13/24 1415   Drainage Amount none 09/13/24 1415   Care, Wound cleansed with;soap and water 09/13/24 1415   Dressing Care open to air;skin barrier agent applied 09/13/24 1415   Periwound Care barrier ointment applied 09/13/24 1415        Wound Check / Follow-up:  Patient seen today for wound follow up. Patient is awake, alert, and intermittently responding to staff, unable to determine level of orientation. Patient noted to be incontinent of stool upon entry into room.  Incontinence care and linen change provided with assistance from primary PCA.  Patient has an external male urinary catheter in place at this time.    Moisture associated skin damage with erosion noted to bilateral gluteal aspects.  Areas of erosion present with moist,  blanchable, red and pink wound bases.  Periwound tissue is primarily dry with blanchable pink/redness, with an area of macerated, moist, white tissue noted to gluteal crease.  Cleansed with normal saline and gauze, blotted dry.  Recommending daily dressing changes with silver impregnated Hydrofiber and silicone border dressing securement.  Recommending every 2 hour turns with offloading at all times.  Recommending quality skin care and hygiene with application of blue top moisture barrier 4 times a day and as needed for incontinence.  Keep patient clean, dry, and free from all moisture.    Left posterior ankle presents with traumatic injury, suspect shearing.  Wound base presents with blanchable, moist, red tissue.  Periwound tissue is dry and pink with minimal tan crusting to borders of wound base.  Cleansed with normal saline gauze, blotted dry.  Recommending every other day dressing changes with non-adherent, petroleum-based gauze and silicone border dressing securement.    Right posterior ankle presents with traumatic injury, suspect shearing.  Wound base presents with blanchable, dry, red and tan tissue.  Periwound tissue is dry and pink.  Cleansed with normal saline and gauze, blotted dry.  Recommending every other day dressing changes with nonadherent, petroleum-based gauze and silicone border dressing securement.    Right groin crease presents with moisture associated skin damage.  Tissue is intact with dry, pink/redness with a fungal presentation.  Cleansed with foam soap and water, patted dry, and applied a thin layer of blue top moisture barrier.  Recommending quality skin care and hygiene with a light dusting of nystatin powder twice a day.  Keep patient clean, dry, and free from all moisture.  May place moisture wicking fabric as needed for added moisture absorption.    Impression: Moisture associated skin damage with erosion to bilateral gluteal aspects, traumatic injury to bilateral posterior ankles,  moisture associated skin damage with a fungal presentation to right groin crease.    Short term goals: Regain skin integrity, skin protection, moisture prevention, pressure reduction, quality skin care and hygiene, daily dressing change, every other day dressing change, topical treatment.    Keke Corona RN    9/13/2024    16:36 EDT

## 2024-09-13 NOTE — PLAN OF CARE
Goal Outcome Evaluation:  Plan of Care Reviewed With: patient        Progress: improving  Outcome Evaluation: Patient is alert to self and occassionally day and time. No signs of pain or distress this shift. VSS stable. Seen by wound care RN this shift. Patient is planning for discharge to [place] [facility]. Nephew notified. Report called to Yuni ORELLANA. Will continue with POC.

## 2024-09-13 NOTE — DISCHARGE SUMMARY
[facility]        HOSPITALIST  DISCHARGE SUMMARY    Patient Name: Vic Glez  : 1940  MRN: [id number]    Date of Admission: 2024  Date of Discharge:  2024  Primary Care Physician: Jennifer Grayson MD    Consults       Date and Time Order Name Status Description    2024  3:45 PM Inpatient Neurology Consult General Completed             Active and Resolved Hospital Problems:  Active Hospital Problems   No active problems to display.      Resolved Hospital Problems    Diagnosis POA    **Seizure [R56.9] Yes     Acute metabolic encephalopathy.  Improving  ?  Possible seizure activity  ?  Complicated urinary tract infection.  Finished antibiotics  Sepsis with unclear source (fever, tachypnea, lactic acidosis at offsite facility on 2024).  Resolved  Elevated troponin likely secondary to demand ischemia type II MI due to above processes  History of bilateral PE/left lower extremity DVT  Parkinson disease.?  Parkinson's dementia  Hypertension  Hyperlipidemia  Hypothyroidism  Hospital Course     Hospital Course:  Vic Glez is a 84 y.o. male with past medical history of Parkinson disease, bilateral pulmonary embolisms/left lower extremity DVT on Eliquis, hypertension, hyperlipidemia, who initially presented to [facility] with altered mental status on 2024.  Initially was believed to be secondary to urinary tract infection until the morning of 2024 when nursing staff noted he was significantly diaphoretic and unresponsive.  He at that time he was febrile with an elevated lactic but no leukocytosis.  He was started on broad antibiotics and given fluid resuscitation.  Lactic acidosis improved and patient had improved back to baseline within an hour.  However over the course of 2 days he had intermittent episodes of what was reported as possible seizure activity but it seemed he could also shake his head yes and no while this was going on.  Apparently he has had similar  episodes at his nursing home prior to this admission.  Due to the need for neurology evaluation request was made to transfer to our facility.  Patient was accepted and transferred in stable condition.  Upon arrival here patient is hemodynamically stable and in his normal state of health.  Lab workup is unremarkable including a normal lactate.  Troponin mildly elevated but down trended on repeat.  Patient had 2 head CTs (most recent 09/06/2024) done at [facility] and neither showed any acute abnormalities but did show multiple chronic white matter changes.  Upon arrival to our facility patient is alert and oriented.  Denies recollecting the seizure episodes.  Denies having any headache.  No history of seizures.  Patient denies any dysuria currently.  Patient evaluated by teleneurology, they felt the patient symptoms likely metabolic encephalopathy exacerbating his underlying Parkinson's disease, patient has been treated with broad-spectrum antibiotics and seems to be improving clinically, patient is a long-term resident at [facility] and rehab, he is a bit old and can return once acute issues resolve.  Patient seen by neurology.  EEG negative for epileptic discharges.     Interval Followup:   No acute events overnight, patient resting comfortably in bed, patient continues to tolerate antibiotics, urine culture from [facility] showed mixed jocelyn.  Finished antibiotics today  Hauser catheter DC'd yesterday has been urinating, incontinent without retention of urine.  Stable for discharge back to nursing facility    DISCHARGE Follow Up Recommendations for labs and diagnostics: PCP      Day of Discharge     Vital Signs:  Temp:  [97.9 °F (36.6 °C)-98.6 °F (37 °C)] 97.9 °F (36.6 °C)  Heart Rate:  [62-72] 62  Resp:  [15-20] 16  BP: (123-143)/(72-96) 123/75  Flow (L/min):  [2] 2    Physical Exam:     GEN: No acute distress.  Awake alert follows vocal commands although slow  HEENT: Moist mucous membranes  LUNGS: Equal  chest rise bilaterally  CARDIAC: Regular rate and rhythm  NEURO: Moving all 4 extremities spontaneously  SKIN: No obvious breakdown  Discharge Details        Discharge Medications        Continue These Medications        Instructions Start Date   acetaminophen 325 MG tablet  Commonly known as: TYLENOL   650 mg, Oral, Every 4 Hours PRN      apixaban 5 MG tablet tablet  Commonly known as: ELIQUIS   5 mg, Oral, 2 Times Daily      atorvastatin 20 MG tablet  Commonly known as: LIPITOR   20 mg, Oral, Daily      carbidopa-levodopa  MG per tablet  Commonly known as: SINEMET   1 tablet, Oral, 4 Times Daily      Divalproex Sodium 125 MG capsule  Commonly known as: DEPAKOTE SPRINKLE   250 mg, Oral, 2 Times Daily      folic acid 1 MG tablet  Commonly known as: FOLVITE   1 mg, Oral, Daily      levothyroxine 25 MCG tablet  Commonly known as: SYNTHROID, LEVOTHROID   25 mcg, Oral, Daily      lisinopril 5 MG tablet  Commonly known as: PRINIVIL,ZESTRIL   5 mg, Oral, Daily      loratadine 10 MG tablet  Commonly known as: CLARITIN   10 mg, Oral, Daily      propranolol 60 MG tablet  Commonly known as: INDERAL   60 mg, Oral, 2 Times Daily      vitamin B-12 1000 MCG tablet  Commonly known as: CYANOCOBALAMIN   1,000 mcg, Oral, Daily             Stop These Medications      azithromycin 500 MG tablet  Commonly known as: ZITHROMAX              No Known Allergies    Discharge Disposition:  Skilled Nursing Facility (DC - External).  Via EMS to [facility]    Diet:  Diet Instructions       Diet: Cardiac Diets; Healthy Heart (2-3 Na+); Thin (IDDSI 0)      Discharge Diet: Cardiac Diets    Cardiac Diet: Healthy Heart (2-3 Na+)    Fluid Consistency: Thin (IDDSI 0)            Discharge Activity:   Activity Instructions       Activity as Tolerated              CODE STATUS:  Code Status and Medical Interventions: No CPR (Do Not Attempt to Resuscitate); Limited Support; No intubation (DNI)   Ordered at: 09/11/24 1305     Medical  Intervention Limits:    No intubation (DNI)     Level Of Support Discussed With:    Health Care Surrogate     Code Status (Patient has no pulse and is not breathing):    No CPR (Do Not Attempt to Resuscitate)     Medical Interventions (Patient has pulse or is breathing):    Limited Support         No future appointments.    Additional Instructions for the Follow-ups that You Need to Schedule       Discharge Follow-up with PCP   As directed       Currently Documented PCP:    Jennifer Grayson MD    PCP Phone Number:    [phone]     Follow Up Details: 1 week                Pertinent  and/or Most Recent Results     PROCEDURES:   * Cannot find OR case *     LAB RESULTS:      Lab 09/12/24 0529 09/11/24  0501 09/10/24  0513 09/09/24  0526 09/08/24  2202 09/08/24  1956   WBC 7.54 7.83 7.61 6.92  --  7.05   HEMOGLOBIN 11.4* 12.3* 11.3* 11.5*  --  10.8*   HEMATOCRIT 35.6* 36.9* 35.4* 35.0*  --  33.8*   PLATELETS 214 227 200 203  --  189   NEUTROS ABS 4.53 4.88 4.76 3.98  --  4.50   IMMATURE GRANS (ABS) 0.13* 0.15* 0.10* 0.07*  --  0.08*   LYMPHS ABS 1.80 1.63 1.69 1.87  --  1.38   MONOS ABS 0.77 0.80 0.71 0.65  --  0.71   EOS ABS 0.23 0.29 0.29 0.28  --  0.32   MCV 94.4 93.9 95.2 92.6  --  94.2   PROCALCITONIN  --   --   --   --  0.08  --    LACTATE  --   --   --   --   --  0.9   PROTIME  --   --   --   --   --  17.5*         Lab 09/12/24  0529 09/11/24  0501 09/10/24  0513 09/09/24  0526 09/08/24  2202 09/08/24  1956   SODIUM 140 137 138 139  --  138   POTASSIUM 4.1 4.2 4.1 4.6  --  4.4   CHLORIDE 108* 103 105 108*  --  105   CO2 24.6 25.8 23.7 22.5  --  25.2   ANION GAP 7.4 8.2 9.3 8.5  --  7.8   BUN 24* 23 20 19  --  17   CREATININE 1.17 1.09 1.06 0.93  --  0.96   EGFR 62.2 67.8 70.1 82.0  --  78.9   GLUCOSE 103* 101* 99 100*  --  99   CALCIUM 8.7 9.0 9.2 9.0  --  8.9   MAGNESIUM 2.0 2.0 2.0 1.9  --  1.9   PHOSPHORUS 3.4 3.5 3.3  --   --  3.2   TSH  --   --   --   --  8.490*  --          Lab 09/12/24  0529  09/11/24  0501 09/10/24  0513 09/08/24 1956   TOTAL PROTEIN 6.2 6.6 6.3 6.3   ALBUMIN 3.1* 3.2* 3.1* 3.1*   GLOBULIN 3.1 3.4 3.2 3.2   ALT (SGPT) <5 <5 <5 <5   AST (SGOT) 12 12 12 12   BILIRUBIN 0.3 0.3 0.4 0.3   ALK PHOS 41 45 45 45         Lab 09/08/24 2202 09/08/24 1956   PROBNP  --  888.0   HSTROP T 46* 48*   PROTIME  --  17.5*   INR  --  1.41*             Lab 09/09/24 0526   VITAMIN B 12 1,302*         Brief Urine Lab Results  (Last result in the past 365 days)        Color   Clarity   Blood   Leuk Est   Nitrite   Protein   CREAT   Urine HCG        08/20/24 0507 Yellow   Clear   Moderate (2+)   Trace   Negative   100 mg/dL (2+)                 Microbiology Results (last 10 days)       Procedure Component Value - Date/Time    S. Pneumo Ag Urine or CSF - Urine, Urine, Clean Catch [[id number]]  (Normal) Collected: 09/11/24 1725    Lab Status: Final result Specimen: Urine, Clean Catch Updated: 09/11/24 1757     Strep Pneumo Ag Negative    Legionella Antigen, Urine - Urine, Urine, Clean Catch [[id number]]  (Normal) Collected: 09/11/24 1725    Lab Status: Final result Specimen: Urine, Clean Catch Updated: 09/11/24 1757     LEGIONELLA ANTIGEN, URINE Negative    Mycoplasma Pneumoniae Antibody, IgM - Blood, [[id number]] Collected: 09/09/24 0526    Lab Status: Final result Specimen: Blood Updated: 09/12/24 1511     M pneumoniae IgM Abs <770 U/mL      Comment:                              Negative            <770  Clinically significant amount of M. pneumoniae antibody  not detected.                               Low Positive   770 - 950  M. pneumoniae specific IgM presumptively detected.  It  is recommended that another sample be collected 1-2  weeks later to assure reactivity.                               Positive            >950  Highly significant amount of M. pneumoniae specific  IgM antibody detected.       Narrative:      Performed at:  [address] [place] [address]  [id number]  :  Chai Adame PhD, Phone:  [phone]    MRSA Screen, PCR (Inpatient) - Swab, Nares [[id number]]  (Normal) Collected: 09/09/24 0112    Lab Status: Final result Specimen: Swab from Nares Updated: 09/09/24 0320     MRSA PCR No MRSA Detected    Narrative:      The negative predictive value of this diagnostic test is high and should only be used to consider de-escalating anti-MRSA therapy. A positive result may indicate colonization with MRSA and must be correlated clinically.    Blood Culture - Blood, Arm, Right [[id number]]  (Normal) Collected: 09/08/24 1956    Lab Status: Preliminary result Specimen: Blood from Arm, Right Updated: 09/12/24 2015     Blood Culture No growth at 4 days    Blood Culture - Blood, Arm, Left [[id number]]  (Normal) Collected: 09/08/24 1956    Lab Status: Preliminary result Specimen: Blood from Arm, Left Updated: 09/12/24 2015     Blood Culture No growth at 4 days    Influenza Antigen, Rapid - , [[id number]] Collected: 09/03/24 2118    Lab Status: Final result Updated: 09/08/24 1926    COVID-19, ABBOTT IN-HOUSE,NASAL Swab (NO TRANSPORT MEDIA) 2 HR TAT - , [[id number]] Collected: 09/03/24 2118    Lab Status: Final result Updated: 09/08/24 1926            XR Chest 1 View    Result Date: 9/8/2024  Impression: Bilateral infiltrates are seen. They are thought to be decreased on the left since 8/20/2024. The findings may represent infectious multifocal pneumonia. Pulmonary edema cannot be excluded.    Portions of this note were completed with a voice recognition program.       Electronically Signed By-Watson Brush MD On:9/8/2024 8:00 PM      XR Chest 1 View    Result Date: 9/6/2024  Impression: No significant interval change. Electronically Signed: Gwendolyn Reeves MD 2024/09/06 at 12:37 CDT Reading Location ID and State: 1422 / [place] Tel [phone], Service support  [phone], Fax [phone]    CT Head Without Contrast    Result Date: 9/6/2024  Impression: No acute intracranial process  identified. Chronic involutional and white matter changes. Electronically Signed: Gwendolyn Reeves MD 2024/09/06 at 10:48 CDT Reading Location ID and State: 1422 / [place] Tel [phone], Service support  [phone], Fax [phone]    MRI Brain Without Contrast    Result Date: 9/4/2024  Impression: Involutional changes of the brain, as described above. No acute infarct. Electronically Signed: Ramon Urban MD 2024/09/04 at 8:58 CDT Reading Location ID and State: 994 / [place] Tel [phone], Service support  [phone], Fax [phone]    XR Chest 1 View    Result Date: 9/3/2024  Impression: Slight increase in interstitial markings may be in part due to low lung volumes, however cannot rule out infection and/or edema. Electronically Signed: Tom Ku MD 2024/09/03 at 19:53 CDT Reading Location ID and State: 3894 / [place] Tel [phone], Service support  [phone], Fax [phone]    CT Head Without Contrast    Result Date: 9/3/2024  Impression: No acute intracranial abnormality. Chronic involutional and ischemic changes of the brain. Electronically Signed: Tom Ku MD 2024/09/03 at 19:33 CDT Reading Location ID and State: 3894 / [place] Tel [phone], Service support  [phone], Fax [phone]    CT Head Without Contrast    Result Date: 8/23/2024  Impression: 1.  No evidence of acute intracranial pathology. 2.  Diffuse involutional changes and chronic ischemic small vessel white matter disease. AIDOC was utilized to assist in identifying pertinent positive findings. 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 Electronically Signed: Jamir Dobbins MD 2024/08/23 at 1:30 CDT Reading Location ID and State: 4210 / [place] Tel  [phone], Service support  [phone], Fax [phone]    FL Video Swallow With Speech Single Contrast    Result Date: 8/21/2024  Impression: Impression: 1. No tracheal aspiration 2. Flash laryngeal penetration with sequential straw trials of thin liquid barium Please consult speech pathology report for further details regarding the exam and for dietary recommendations. Report dictated by: Kim Darien  I have personally reviewed this case and agree with the findings above: Electronically Signed: Nahum Soto MD  8/21/2024 4:38 PM EDT  Workstation ID: [id number]    XR Chest 1 View    Result Date: 8/20/2024  Impression: Infiltrate or atelectasis at the left lung base. Electronically Signed: Brent Hopper MD  8/20/2024 5:35 AM EDT  Workstation ID: [id number]    US Venous Doppler Lower Extremity Bilateral (duplex)    Result Date: 8/19/2024  Impression: Positive for deep venous thrombosis of the left popliteal vein and left femoral vein. Electronically Signed: Ramon Urban MD 2024/08/19 at 8:11 CDT Reading Location ID and State: 994 / [place] Tel [phone], Service support  [phone], Fax [phone]    XR Abdomen KUB    Result Date: 8/18/2024  Impression: There is a feeding tube/ nasogastric tube noted. The tip is in the region of the stomach. Electronically Signed: Rubens Olivas MD 2024/08/18 at 13:30 CDT Reading Location ID and State: 4030 / [place] Tel [phone], Service support  [phone], Fax [phone]    CT Angiogram Chest Pulmonary Embolism    Result Date: 8/17/2024  Impression: 1.  Right middle and left lower lobe segmental and subsegmental pulmonary emboli. There are no large central emboli identified. 2.  Bibasilar consolidation which may represent atelectasis. Electronically Signed: Mario Yarbrough MD 2024/08/17 at 18:15 CDT Reading Location ID and State: 1414 / [place] Tel [phone], Service support  [phone], Fax [phone]    CT Chest Without Contrast  Diagnostic    Result Date: 8/16/2024  Impression: Left basilar consolidation/atelectasis. Study prominent ascending aorta. Electronically Signed: Vishal Cortez DO 2024/08/16 at 18:27 CDT Reading Location ID and State: 306 / [place] Tel [phone], Service support  [phone], Fax [phone]    MRI Brain Without Contrast    Result Date: 8/16/2024  Impression: 1.  Moderate to extensive diffuse multi lobar involutional changes, mild to moderate chronic microvascular deep white matter disease. 2.  No intraparenchymal mass, hemorrhage, or acute territorial infarct. 3.  No radiographically significant sinus disease. Electronically Signed: Ramon Ferrari MD 2024/08/16 at 16:35 CDT Reading Location ID and State: 3337 / [place] Tel [phone], Service support  [phone], Fax [phone]    CTA Head Neck w/Contrast STROKE w/LVO per Contrast Protocol    Result Date: 8/16/2024  Impression: Stenosis at the origin of the vertebral arteries bilaterally. Electronically Signed: Vishal Cortez DO 2024/08/16 at 16:01 CDT Reading Location ID and State: 306 / [place] Tel [phone], Service support  [phone], Fax [phone]    XR Chest 1 View    Result Date: 8/16/2024  Impression: Degenerative changes, as described above.  No demonstrated acute cardiopulmonary process. Electronically Signed: Homar Becerril MD 2024/08/16 at 13:47 CDT Reading Location ID and State: 4397 / [place] Tel [phone], Service support  [phone], Fax [phone]    CT Head Without Contrast    Result Date: 8/16/2024  Impression: Chronic involutional changes of the brain. Electronically Signed: Ramon Urban MD 2024/08/16 at 13:36 CDT Reading Location ID and State: 994 / [place] Tel [phone], Service support  [phone], Fax [phone]                 Imaging Results (All)       Procedure Component Value Units Date/Time    XR Chest 1 View [[id number]] Collected: 09/08/24 1958     Updated: 09/08/24 2002    Narrative:      XR CHEST 1 VW-     Date of  exam: 9/8/2024 7:40 PM.     Indication: AMS  (altered mental status).        Comparison: 8/20/2024.     FINDINGS:  A single AP (or PA) upright portable view of the chest is provided for  review. Bilateral infiltrates are seen. The findings may represent  infectious multifocal pneumonia. Pulmonary edema cannot be excluded.  Probably no cardiomediastinal enlargement. No pneumothorax. No  pneumomediastinum. There is slight pulmonary hypoinflation. External  artifacts obscure detail. The thoracic aorta is atherosclerotic. There  may be a small right pleural effusion. Minimal, if any, left pleural  effusion is seen. The deviation of the trachea to the right is probably  related to vascular ectasia. Degenerative changes involve the left  shoulder and the imaged spine.          Impression:      Bilateral infiltrates are seen. They are thought to be decreased on the  left since 8/20/2024. The findings may represent infectious multifocal  pneumonia. Pulmonary edema cannot be excluded.            Portions of this note were completed with a voice recognition program.                    Electronically Signed By-Watson Brush MD On:9/8/2024 8:00 PM       CT Outside Films [[id number]] Resulted: 09/08/24 1959     Updated: 09/08/24 1959    Narrative:      This procedure was auto-finalized with no dictation required.    XR Outside Films [[id number]] Resulted: 09/08/24 1958     Updated: 09/08/24 1958    Narrative:      This procedure was auto-finalized with no dictation required.    XR Outside Films [[id number]] Resulted: 09/08/24 1956     Updated: 09/08/24 1956    Narrative:      This procedure was auto-finalized with no dictation required.             Labs Pending at Discharge:  Pending Labs       Order Current Status    Blood Culture - Blood, Arm, Left Preliminary result    Blood Culture - Blood, Arm, Right Preliminary result                Time spent on Discharge including face to face service: 31 minutes  Part of this note may be  an electronic transcription/translation of spoken language to printed text using the Dragon Dictation System.     TElectronically signed by Smooth Chavez MD, 09/13/24, 1:41 PM EDT.